# Patient Record
Sex: MALE | Race: WHITE | NOT HISPANIC OR LATINO | Employment: FULL TIME | URBAN - METROPOLITAN AREA
[De-identification: names, ages, dates, MRNs, and addresses within clinical notes are randomized per-mention and may not be internally consistent; named-entity substitution may affect disease eponyms.]

---

## 2017-03-15 RX ORDER — CLOPIDOGREL BISULFATE 75 MG/1
75 TABLET ORAL DAILY
Qty: 90 TABLET | Refills: 1 | Status: SHIPPED | OUTPATIENT
Start: 2017-03-15 | End: 2017-09-18 | Stop reason: SDUPTHER

## 2017-03-15 RX ORDER — METOPROLOL SUCCINATE 100 MG/1
100 TABLET, EXTENDED RELEASE ORAL DAILY
Qty: 90 TABLET | Refills: 3 | Status: SHIPPED | OUTPATIENT
Start: 2017-03-15 | End: 2018-03-21 | Stop reason: SDUPTHER

## 2017-03-15 RX ORDER — LISINOPRIL 20 MG/1
20 TABLET ORAL DAILY
Qty: 90 TABLET | Refills: 1 | Status: SHIPPED | OUTPATIENT
Start: 2017-03-15 | End: 2017-09-18 | Stop reason: SDUPTHER

## 2017-03-15 RX ORDER — ATORVASTATIN CALCIUM 20 MG/1
20 TABLET, FILM COATED ORAL DAILY
Qty: 90 TABLET | Refills: 1 | Status: SHIPPED | OUTPATIENT
Start: 2017-03-15 | End: 2017-09-18 | Stop reason: SDUPTHER

## 2017-03-15 NOTE — TELEPHONE ENCOUNTER
----- Message from Hafsa Venegas sent at 3/15/2017  8:11 AM CDT -----  Contact: Patient  Patient needs refills on Metoprpol SUCC, 100 mg; Atorvastatin, 20 mg; Lisinopril, 20 mg; and   Clopidogrel, 75 mg sent [facility]. Any questions call patient [phone].

## 2017-09-18 DIAGNOSIS — M10.9 GOUT, UNSPECIFIED: ICD-10-CM

## 2017-09-18 RX ORDER — ALLOPURINOL 300 MG/1
300 TABLET ORAL DAILY
Qty: 90 TABLET | Refills: 0 | Status: SHIPPED | OUTPATIENT
Start: 2017-09-18 | End: 2017-12-18 | Stop reason: SDUPTHER

## 2017-09-18 NOTE — TELEPHONE ENCOUNTER
----- Message from Keira Lincoln sent at 9/18/2017  8:21 AM CDT -----  Anthony, patient , Calling to get refill on medications. Rx lisinopril  20 MG tablet; atorvastatin   20 MG tablet; clopidogrel   75 mg tablet.  Please advise. Thanks.    [facility] - [place], [place] - [address] [place], [place] [address] [facility] [address]  Phone: [phone] Fax: [phone]

## 2017-09-18 NOTE — TELEPHONE ENCOUNTER
----- Message from Keira Lincoln sent at 9/18/2017  8:23 AM CDT -----  Contact: Robin Cruz, patient [phone], calling to get a refill on Rx allopurinol  300 MG tablet.  Please advise. Thanks.  [facility] - [place], [place] - [address] [place] [address]  Phone: [phone] Fax: [phone]

## 2017-09-19 RX ORDER — ATORVASTATIN CALCIUM 20 MG/1
20 TABLET, FILM COATED ORAL DAILY
Qty: 90 TABLET | Refills: 1 | Status: SHIPPED | OUTPATIENT
Start: 2017-09-19 | End: 2018-03-21 | Stop reason: SDUPTHER

## 2017-09-19 RX ORDER — CLOPIDOGREL BISULFATE 75 MG/1
75 TABLET ORAL DAILY
Qty: 90 TABLET | Refills: 1 | Status: SHIPPED | OUTPATIENT
Start: 2017-09-19 | End: 2018-03-21 | Stop reason: SDUPTHER

## 2017-09-19 RX ORDER — LISINOPRIL 20 MG/1
20 TABLET ORAL DAILY
Qty: 90 TABLET | Refills: 1 | Status: SHIPPED | OUTPATIENT
Start: 2017-09-19 | End: 2018-03-21 | Stop reason: SDUPTHER

## 2017-09-27 ENCOUNTER — TELEPHONE (OUTPATIENT)
Dept: FAMILY MEDICINE | Facility: CLINIC | Age: 62
End: 2017-09-27

## 2017-09-27 NOTE — TELEPHONE ENCOUNTER
----- Message from Sai Carreno sent at 9/27/2017  2:03 PM CDT -----  Contact: Patient  Patient states that he would like to know if he had a Tetinus Shot in the past 10 years and if so, when.  Please call him back at [phone].  Thank you

## 2017-11-10 ENCOUNTER — TELEPHONE (OUTPATIENT)
Dept: FAMILY MEDICINE | Facility: CLINIC | Age: 62
End: 2017-11-10

## 2017-11-10 NOTE — TELEPHONE ENCOUNTER
----- Message from Hannah Escalante sent at 11/10/2017 12:10 PM CST -----  Contact: self  Patient is needing a call back to discuss flu shot shot/   Please call back [phone]

## 2017-12-18 ENCOUNTER — PATIENT OUTREACH (OUTPATIENT)
Dept: ADMINISTRATIVE | Facility: HOSPITAL | Age: 62
End: 2017-12-18

## 2017-12-18 DIAGNOSIS — I10 ESSENTIAL HYPERTENSION: Primary | ICD-10-CM

## 2017-12-18 DIAGNOSIS — M10.9 GOUT, UNSPECIFIED CAUSE, UNSPECIFIED CHRONICITY, UNSPECIFIED SITE: ICD-10-CM

## 2017-12-18 DIAGNOSIS — M1A.9XX1 CHRONIC TOPHACEOUS GOUT: ICD-10-CM

## 2017-12-18 RX ORDER — ALLOPURINOL 300 MG/1
300 TABLET ORAL DAILY
Qty: 90 TABLET | Refills: 0 | Status: SHIPPED | OUTPATIENT
Start: 2017-12-18 | End: 2018-03-21 | Stop reason: SDUPTHER

## 2017-12-18 NOTE — PROGRESS NOTES
HTN registry, over 1 year.  Called pt to inform:    Due for an office visit with him, your fasting cholesterol labs, colon cancer screening, and possibly some immunizations (tetanus, shingles, and flu)    Last ov Neil 11/2016    Pt scheduled labs and ov - anything you want to add?

## 2017-12-18 NOTE — TELEPHONE ENCOUNTER
----- Message from Keira Lnicoln sent at 12/18/2017  8:30 AM CST -----  Contact: Patient  Anthony, patient [phone], Patient is calling for a new Rx for allopurinol (ZYLOPRIM) 300 MG tablet. He is out of refills. 90 days with refills. Please advise. Thanks.  [facility] - [address] [address]  Phone: [phone] Fax: [phone]

## 2018-02-15 ENCOUNTER — LAB VISIT (OUTPATIENT)
Dept: LAB | Facility: HOSPITAL | Age: 63
End: 2018-02-15
Attending: FAMILY MEDICINE
Payer: COMMERCIAL

## 2018-02-15 ENCOUNTER — OFFICE VISIT (OUTPATIENT)
Dept: FAMILY MEDICINE | Facility: CLINIC | Age: 63
End: 2018-02-15
Payer: COMMERCIAL

## 2018-02-15 ENCOUNTER — OFFICE VISIT (OUTPATIENT)
Dept: CARDIOLOGY | Facility: CLINIC | Age: 63
End: 2018-02-15
Payer: COMMERCIAL

## 2018-02-15 VITALS
BODY MASS INDEX: 37.35 KG/M2 | WEIGHT: 291 LBS | TEMPERATURE: 98 F | HEIGHT: 74 IN | HEART RATE: 60 BPM | DIASTOLIC BLOOD PRESSURE: 72 MMHG | SYSTOLIC BLOOD PRESSURE: 134 MMHG

## 2018-02-15 VITALS
WEIGHT: 291 LBS | HEIGHT: 74 IN | HEART RATE: 65 BPM | BODY MASS INDEX: 37.35 KG/M2 | DIASTOLIC BLOOD PRESSURE: 88 MMHG | SYSTOLIC BLOOD PRESSURE: 138 MMHG

## 2018-02-15 DIAGNOSIS — M1A.9XX1 CHRONIC TOPHACEOUS GOUT: Primary | ICD-10-CM

## 2018-02-15 DIAGNOSIS — I10 ESSENTIAL HYPERTENSION: ICD-10-CM

## 2018-02-15 DIAGNOSIS — M1A.9XX1 CHRONIC TOPHACEOUS GOUT: ICD-10-CM

## 2018-02-15 DIAGNOSIS — Z95.1 S/P CABG (CORONARY ARTERY BYPASS GRAFT): ICD-10-CM

## 2018-02-15 DIAGNOSIS — E78.5 DYSLIPIDEMIA: ICD-10-CM

## 2018-02-15 DIAGNOSIS — I25.9 ISCHEMIC HEART DISEASE DUE TO CORONARY ARTERY OBSTRUCTION: Primary | ICD-10-CM

## 2018-02-15 DIAGNOSIS — I24.0 ISCHEMIC HEART DISEASE DUE TO CORONARY ARTERY OBSTRUCTION: Primary | ICD-10-CM

## 2018-02-15 LAB
ALBUMIN SERPL BCP-MCNC: 3.1 G/DL
ALP SERPL-CCNC: 82 U/L
ALT SERPL W/O P-5'-P-CCNC: 15 U/L
ANION GAP SERPL CALC-SCNC: 9 MMOL/L
AST SERPL-CCNC: 12 U/L
BILIRUB SERPL-MCNC: 1 MG/DL
BUN SERPL-MCNC: 21 MG/DL
CALCIUM SERPL-MCNC: 9.2 MG/DL
CHLORIDE SERPL-SCNC: 111 MMOL/L
CHOLEST SERPL-MCNC: 138 MG/DL
CHOLEST/HDLC SERPL: 4.9 {RATIO}
CO2 SERPL-SCNC: 25 MMOL/L
CREAT SERPL-MCNC: 1.8 MG/DL
EST. GFR  (AFRICAN AMERICAN): 45.3 ML/MIN/1.73 M^2
EST. GFR  (NON AFRICAN AMERICAN): 39.2 ML/MIN/1.73 M^2
GLUCOSE SERPL-MCNC: 137 MG/DL
HDLC SERPL-MCNC: 28 MG/DL
HDLC SERPL: 20.3 %
LDLC SERPL CALC-MCNC: 84.6 MG/DL
NONHDLC SERPL-MCNC: 110 MG/DL
POTASSIUM SERPL-SCNC: 5.2 MMOL/L
PROT SERPL-MCNC: 6.6 G/DL
SODIUM SERPL-SCNC: 145 MMOL/L
TRIGL SERPL-MCNC: 127 MG/DL
URATE SERPL-MCNC: 6.4 MG/DL

## 2018-02-15 PROCEDURE — 99999 PR PBB SHADOW E&M-EST. PATIENT-LVL III: CPT | Mod: PBBFAC,,, | Performed by: FAMILY MEDICINE

## 2018-02-15 PROCEDURE — 3008F BODY MASS INDEX DOCD: CPT | Mod: S$GLB,,, | Performed by: INTERNAL MEDICINE

## 2018-02-15 PROCEDURE — 84550 ASSAY OF BLOOD/URIC ACID: CPT

## 2018-02-15 PROCEDURE — 80061 LIPID PANEL: CPT

## 2018-02-15 PROCEDURE — 80053 COMPREHEN METABOLIC PANEL: CPT

## 2018-02-15 PROCEDURE — 36415 COLL VENOUS BLD VENIPUNCTURE: CPT | Mod: PO

## 2018-02-15 PROCEDURE — 3008F BODY MASS INDEX DOCD: CPT | Mod: S$GLB,,, | Performed by: FAMILY MEDICINE

## 2018-02-15 PROCEDURE — 99214 OFFICE O/P EST MOD 30 MIN: CPT | Mod: S$GLB,,, | Performed by: FAMILY MEDICINE

## 2018-02-15 PROCEDURE — 99214 OFFICE O/P EST MOD 30 MIN: CPT | Mod: S$GLB,,, | Performed by: INTERNAL MEDICINE

## 2018-02-15 PROCEDURE — 99999 PR PBB SHADOW E&M-EST. PATIENT-LVL II: CPT | Mod: PBBFAC,,, | Performed by: INTERNAL MEDICINE

## 2018-02-15 NOTE — PROGRESS NOTES
"Subjective:       Patient ID: Anthony Kline is a 62 y.o. male.    Chief Complaint: Follow-up    Health maintenance and follow-up of tophaceous gout.  He has been on allopurinol 300 mg.  His tophi have for the most part decreased in size and he has had no acute gout attacks.  He saw cardiology today.  He has had no chest pain or dyspnea.  He is status post bypass surgery in 2013.  He had a recent episode of low back pain.  It took a week or so to resolve.  He has gained a lot of weight in the past year.  He had lost down to 230 pounds after his heart bypass but has gradually regained it.      Review of Systems   Constitutional: Positive for unexpected weight change. Negative for activity change and fever.   HENT: Negative for hearing loss, rhinorrhea and trouble swallowing.    Eyes: Negative for discharge and visual disturbance.   Respiratory: Negative for chest tightness, shortness of breath and wheezing.    Cardiovascular: Negative for chest pain and palpitations.   Gastrointestinal: Negative for blood in stool, constipation, diarrhea and vomiting.   Endocrine: Negative for polydipsia and polyuria.   Genitourinary: Negative for difficulty urinating, hematuria and urgency.   Musculoskeletal: Positive for back pain. Negative for arthralgias, joint swelling and neck pain.   Neurological: Negative for weakness and headaches.   Psychiatric/Behavioral: Negative for confusion and dysphoric mood.       Objective:     Blood pressure 134/72, pulse 60, temperature 98.4 °F (36.9 °C), height 6' 2" (1.88 m), weight 132 kg (291 lb 0.1 oz).      Physical Exam   Constitutional:   He is overweight and in no distress.   HENT:   Mouth/Throat: Oropharynx is clear and moist. No oropharyngeal exudate.   Neck: No thyromegaly present.   Cardiovascular: Normal rate, regular rhythm, normal heart sounds and intact distal pulses.    No murmur heard.  No carotid bruits   Pulmonary/Chest: Effort normal and breath sounds normal. No " respiratory distress.   Abdominal: Soft. Bowel sounds are normal.   Musculoskeletal: He exhibits no edema.   He has thickened tissue in both olecranon bursae.  Tophi right fourth finger and dorsum of left hand.   Lymphadenopathy:     He has no cervical adenopathy.   Neurological: He is alert.       Assessment:       1. Chronic tophaceous gout    2. Dyslipidemia    3. Essential hypertension    4. S/P CABG (coronary artery bypass graft)        Plan:       Continue allopurinol 300 mg.  I reviewed recent lab work.  We discussed colon cancer screening.  Cologuard.  We talked about an eye exam but he is not interested.

## 2018-02-15 NOTE — PROGRESS NOTES
Subjective:    Patient ID:  Anthony Kline is a 62 y.o. male who presents for follow-up of cad    HPI  He comes for follow up with no major problems, no chest pain, no shortness of breath.  FC II    Review of Systems   Constitution: Negative for decreased appetite, weakness, malaise/fatigue, weight gain and weight loss.   Cardiovascular: Negative for chest pain, dyspnea on exertion, leg swelling, palpitations and syncope.   Respiratory: Negative for cough and shortness of breath.    Gastrointestinal: Negative.    All other systems reviewed and are negative.       Objective:    Physical Exam   Constitutional: He is oriented to person, place, and time. He appears well-developed and well-nourished.   HENT:   Head: Normocephalic.   Eyes: Pupils are equal, round, and reactive to light.   Neck: Normal range of motion. Neck supple. No JVD present. Carotid bruit is not present. No thyromegaly present.   Cardiovascular: Normal rate, regular rhythm, normal heart sounds, intact distal pulses and normal pulses.  PMI is not displaced.  Exam reveals no gallop.    No murmur heard.  Pulmonary/Chest: Effort normal and breath sounds normal.   Abdominal: Soft. Normal appearance. He exhibits no mass. There is no hepatosplenomegaly. There is no tenderness.   Musculoskeletal: Normal range of motion. He exhibits no edema.   Neurological: He is alert and oriented to person, place, and time. He has normal strength and normal reflexes. No sensory deficit.   Skin: Skin is warm and intact.   Psychiatric: He has a normal mood and affect.   Nursing note and vitals reviewed.        Assessment:       1. Ischemic heart disease due to coronary artery obstruction    2. Essential hypertension    3. Dyslipidemia    4. S/P CABG (coronary artery bypass graft)         Plan:     Continue all cardiac medications  Regular exercise program  Weight loss  1 yr f/u with stress echo

## 2018-03-21 DIAGNOSIS — M10.9 GOUT, UNSPECIFIED CAUSE, UNSPECIFIED CHRONICITY, UNSPECIFIED SITE: ICD-10-CM

## 2018-03-21 RX ORDER — LISINOPRIL 20 MG/1
20 TABLET ORAL DAILY
Qty: 90 TABLET | Refills: 3 | Status: SHIPPED | OUTPATIENT
Start: 2018-03-21 | End: 2021-10-05 | Stop reason: ALTCHOICE

## 2018-03-21 RX ORDER — CLOPIDOGREL BISULFATE 75 MG/1
75 TABLET ORAL DAILY
Qty: 90 TABLET | Refills: 3 | Status: SHIPPED | OUTPATIENT
Start: 2018-03-21

## 2018-03-21 RX ORDER — ATORVASTATIN CALCIUM 20 MG/1
20 TABLET, FILM COATED ORAL DAILY
Qty: 90 TABLET | Refills: 3 | Status: SHIPPED | OUTPATIENT
Start: 2018-03-21

## 2018-03-21 RX ORDER — ALLOPURINOL 300 MG/1
300 TABLET ORAL DAILY
Qty: 90 TABLET | Refills: 0 | Status: SHIPPED | OUTPATIENT
Start: 2018-03-21 | End: 2018-06-18 | Stop reason: SDUPTHER

## 2018-03-21 RX ORDER — METOPROLOL SUCCINATE 100 MG/1
100 TABLET, EXTENDED RELEASE ORAL DAILY
Qty: 90 TABLET | Refills: 3 | Status: SHIPPED | OUTPATIENT
Start: 2018-03-21

## 2018-03-21 NOTE — TELEPHONE ENCOUNTER
----- Message from Aminata Hussein sent at 3/21/2018  8:12 AM CDT -----  Contact: self  Patient called regarding refill of medications. Please contact [phone] (home)       clopidogrel (PLAVIX) 75 mg tablet  atorvastatin (LIPITOR) 20 MG tablet  metoprolol succinate (TOPROL-XL) 100 MG 24 hr tablet  lisinopril (PRINIVIL,ZESTRIL) 20 MG tablet      [facility] - [address] [address]  Phone: [phone] Fax: [phone]

## 2018-03-21 NOTE — TELEPHONE ENCOUNTER
----- Message from Aminata Hussein sent at 3/21/2018  8:11 AM CDT -----  Contact: self  Patient called regarding refill of medication. Please contact [phone] (home)     allopurinol (ZYLOPRIM) 300 MG tablet    [facility] - [facility], [place] - [address] [place] [address]  Phone: [phone] Fax: [phone]

## 2018-03-22 ENCOUNTER — TELEPHONE (OUTPATIENT)
Dept: FAMILY MEDICINE | Facility: CLINIC | Age: 63
End: 2018-03-22

## 2018-03-22 NOTE — TELEPHONE ENCOUNTER
----- Message from Carolyn Jackson sent at 3/22/2018  8:17 AM CDT -----  Contact: self  Patient [phone] called and left a message yesterday about having some prescriptions refilled but did not receive any response back/he is calling to leave a second message today/please advise

## 2018-03-22 NOTE — TELEPHONE ENCOUNTER
----- Message from Carolyn Jackson sent at 3/22/2018  8:17 AM CDT -----  Contact: self  Patient [phone] called and left a message yesterday about having some prescriptions refilled but did not receive any response back/he is calling to leave a second message today/please advise

## 2018-06-18 DIAGNOSIS — M10.9 GOUT, UNSPECIFIED CAUSE, UNSPECIFIED CHRONICITY, UNSPECIFIED SITE: ICD-10-CM

## 2018-06-18 RX ORDER — ALLOPURINOL 300 MG/1
300 TABLET ORAL DAILY
Qty: 90 TABLET | Refills: 1 | Status: SHIPPED | OUTPATIENT
Start: 2018-06-18 | End: 2018-12-14 | Stop reason: SDUPTHER

## 2018-06-18 NOTE — TELEPHONE ENCOUNTER
----- Message from Mana Bryanza sent at 6/16/2018  8:38 AM CDT -----  Contact: self [phone]  Type:  RX Refill Request    Who Called:  Anthony  Refill or New Rx:  refill  RX Name and Strength:  allopurinol 300 mg  How is the patient currently taking it? (ex. 1XDay):  1XDay  Is this a 30 day or 90 day RX:  90  Preferred Pharmacy with phone number:    [facility] - [address] [address]  Phone: [phone] Fax: [phone]  Local or Mail Order:  mail  Ordering Provider:  Dr Neil Bautista Call Back Number:  [phone]  Additional Information:  Thank you!

## 2018-09-18 ENCOUNTER — TELEPHONE (OUTPATIENT)
Dept: CARDIOLOGY | Facility: CLINIC | Age: 63
End: 2018-09-18

## 2018-09-18 NOTE — TELEPHONE ENCOUNTER
Patient has been notified of his last shot and verbalized understanding. He states he will call back if needed.

## 2018-09-18 NOTE — TELEPHONE ENCOUNTER
----- Message from Bello Harris sent at 9/18/2018 10:14 AM CDT -----  Contact: patient  Anthony, [phone]. Calling to find out the last time he got his pneumonia shot. Says he was in the hospital and it looked as if he had pneumonia, so he just wanted to check when he last had it to know if he needs to get it again. Please advise. Thanks.

## 2018-10-10 ENCOUNTER — TELEPHONE (OUTPATIENT)
Dept: FAMILY MEDICINE | Facility: CLINIC | Age: 63
End: 2018-10-10

## 2018-10-10 NOTE — TELEPHONE ENCOUNTER
Patient calling just to make sure ok to come for his Flu shot on 10/16 since he was in the hospital confirm patient will be here

## 2018-10-10 NOTE — TELEPHONE ENCOUNTER
----- Message from Carolyn Staley sent at 10/10/2018  9:46 AM CDT -----  Contact: Anthony  Type: Needs Medical Advice    Who Called:  patient  Best Call Back Number: [phone]  Additional Information:  Patient was hospitalized at [facility]; had surgery to remove fluid; Dx pneumonia; discharged 9/28/18; scheduled for Flu vaccination 10/16/18. Thanks!

## 2018-10-16 ENCOUNTER — IMMUNIZATION (OUTPATIENT)
Dept: FAMILY MEDICINE | Facility: CLINIC | Age: 63
End: 2018-10-16
Payer: COMMERCIAL

## 2018-10-16 PROCEDURE — 90686 IIV4 VACC NO PRSV 0.5 ML IM: CPT | Mod: S$GLB,,, | Performed by: INTERNAL MEDICINE

## 2018-10-16 PROCEDURE — 90471 IMMUNIZATION ADMIN: CPT | Mod: S$GLB,,, | Performed by: INTERNAL MEDICINE

## 2018-11-20 ENCOUNTER — HISTORICAL (OUTPATIENT)
Dept: LAB | Facility: HOSPITAL | Age: 63
End: 2018-11-20

## 2018-11-20 LAB
CREAT UR-MCNC: 169.9 MG/DL (ref 30–125)
EST. AVERAGE GLUCOSE BLD GHB EST-MCNC: 128 MG/DL
HBA1C MFR BLD: 6.1 % (ref 4.5–6.2)
MICROALBUMIN UR-MCNC: 2 MG/DL (ref 0–3)
MICROALBUMIN/CREAT RATIO PNL UR: 11.8 MG/GM CR (ref 0–30)

## 2018-12-14 DIAGNOSIS — M10.9 GOUT, UNSPECIFIED CAUSE, UNSPECIFIED CHRONICITY, UNSPECIFIED SITE: ICD-10-CM

## 2018-12-14 NOTE — TELEPHONE ENCOUNTER
----- Message from Caro Arita sent at 12/14/2018  2:58 PM CST -----  Contact: Patient  Type:  RX Refill Request     Who Called:  Patient  Refill or New Rx: Refill  RX Name and Strength:  allopurinol (ZYLOPRIM) 300 MG tablet  How is the patient currently taking it? (ex. 1XDay):  1xday  Is this a 30 day or 90 day RX:  90  Preferred Pharmacy with phone number:    [facility]  [address] [address]  Phone: [phone] Fax: [phone]  Local or Mail Order:  Mail Order  Ordering Provider:  Dr. Darien Bautista Call Back Number:  [phone]  Please call to advise

## 2018-12-15 RX ORDER — ALLOPURINOL 300 MG/1
300 TABLET ORAL DAILY
Qty: 90 TABLET | Refills: 0 | Status: SHIPPED | OUTPATIENT
Start: 2018-12-15 | End: 2019-03-21 | Stop reason: SDUPTHER

## 2019-03-21 ENCOUNTER — TELEPHONE (OUTPATIENT)
Dept: CARDIOLOGY | Facility: CLINIC | Age: 64
End: 2019-03-21

## 2019-03-21 ENCOUNTER — PATIENT MESSAGE (OUTPATIENT)
Dept: FAMILY MEDICINE | Facility: CLINIC | Age: 64
End: 2019-03-21

## 2019-03-21 DIAGNOSIS — I10 ESSENTIAL HYPERTENSION: Primary | ICD-10-CM

## 2019-03-21 DIAGNOSIS — M10.9 GOUT, UNSPECIFIED CAUSE, UNSPECIFIED CHRONICITY, UNSPECIFIED SITE: ICD-10-CM

## 2019-03-21 RX ORDER — ALLOPURINOL 300 MG/1
300 TABLET ORAL DAILY
Qty: 90 TABLET | Refills: 0 | Status: SHIPPED | OUTPATIENT
Start: 2019-03-21 | End: 2019-06-24 | Stop reason: SDUPTHER

## 2019-03-21 NOTE — TELEPHONE ENCOUNTER
----- Message from Ariel Franklin sent at 3/21/2019 10:07 AM CDT -----  Contact: patient  Type: Needs Medical Advice    Who Called:  Patient  Symptoms (please be specific):    How long has patient had these symptoms:    Pharmacy name and phone #:    Best Call Back Number: [phone]  Additional Information: requesting a call back have questions regarding pneumonia shots?

## 2019-03-21 NOTE — TELEPHONE ENCOUNTER
----- Message from Carolyn Jackson sent at 3/21/2019  8:36 AM CDT -----  Contact: self  Patient [phone] is asking to speak with the Nurse about having his Allopurinol refilled/please call

## 2019-03-21 NOTE — TELEPHONE ENCOUNTER
----- Message from Carolyn Jackson sent at 3/21/2019  8:38 AM CDT -----  Contact: self  Patient [phone] is asking when he had his last pneumonia vaccine and what types of pneumonia vaccines has he had?/please advise

## 2019-03-25 ENCOUNTER — LAB VISIT (OUTPATIENT)
Dept: LAB | Facility: HOSPITAL | Age: 64
End: 2019-03-25
Attending: FAMILY MEDICINE
Payer: COMMERCIAL

## 2019-03-25 DIAGNOSIS — I10 ESSENTIAL HYPERTENSION: ICD-10-CM

## 2019-03-25 DIAGNOSIS — M10.9 GOUT, UNSPECIFIED CAUSE, UNSPECIFIED CHRONICITY, UNSPECIFIED SITE: ICD-10-CM

## 2019-03-25 LAB
ALBUMIN SERPL BCP-MCNC: 3.5 G/DL (ref 3.5–5.2)
ALP SERPL-CCNC: 93 U/L (ref 55–135)
ALT SERPL W/O P-5'-P-CCNC: 15 U/L (ref 10–44)
ANION GAP SERPL CALC-SCNC: 7 MMOL/L (ref 8–16)
AST SERPL-CCNC: 9 U/L (ref 10–40)
BILIRUB SERPL-MCNC: 0.6 MG/DL (ref 0.1–1)
BUN SERPL-MCNC: 34 MG/DL (ref 8–23)
CALCIUM SERPL-MCNC: 9.3 MG/DL (ref 8.7–10.5)
CHLORIDE SERPL-SCNC: 104 MMOL/L (ref 95–110)
CHOLEST SERPL-MCNC: 158 MG/DL (ref 120–199)
CHOLEST/HDLC SERPL: 4.3 {RATIO} (ref 2–5)
CO2 SERPL-SCNC: 24 MMOL/L (ref 23–29)
CREAT SERPL-MCNC: 1.7 MG/DL (ref 0.5–1.4)
EST. GFR  (AFRICAN AMERICAN): 48.2 ML/MIN/1.73 M^2
EST. GFR  (NON AFRICAN AMERICAN): 41.7 ML/MIN/1.73 M^2
GLUCOSE SERPL-MCNC: 171 MG/DL (ref 70–110)
HDLC SERPL-MCNC: 37 MG/DL (ref 40–75)
HDLC SERPL: 23.4 % (ref 20–50)
LDLC SERPL CALC-MCNC: 91.4 MG/DL (ref 63–159)
NONHDLC SERPL-MCNC: 121 MG/DL
POTASSIUM SERPL-SCNC: 5.1 MMOL/L (ref 3.5–5.1)
PROT SERPL-MCNC: 6.9 G/DL (ref 6–8.4)
SODIUM SERPL-SCNC: 135 MMOL/L (ref 136–145)
TRIGL SERPL-MCNC: 148 MG/DL (ref 30–150)
URATE SERPL-MCNC: 4.9 MG/DL (ref 3.4–7)

## 2019-03-25 PROCEDURE — 84550 ASSAY OF BLOOD/URIC ACID: CPT

## 2019-03-25 PROCEDURE — 36415 COLL VENOUS BLD VENIPUNCTURE: CPT | Mod: PO

## 2019-03-25 PROCEDURE — 80061 LIPID PANEL: CPT

## 2019-03-25 PROCEDURE — 80053 COMPREHEN METABOLIC PANEL: CPT

## 2019-04-08 ENCOUNTER — OFFICE VISIT (OUTPATIENT)
Dept: FAMILY MEDICINE | Facility: CLINIC | Age: 64
End: 2019-04-08
Payer: COMMERCIAL

## 2019-04-08 ENCOUNTER — LAB VISIT (OUTPATIENT)
Dept: LAB | Facility: HOSPITAL | Age: 64
End: 2019-04-08
Attending: FAMILY MEDICINE
Payer: COMMERCIAL

## 2019-04-08 VITALS
WEIGHT: 282.75 LBS | DIASTOLIC BLOOD PRESSURE: 90 MMHG | HEIGHT: 74 IN | HEART RATE: 68 BPM | SYSTOLIC BLOOD PRESSURE: 138 MMHG | TEMPERATURE: 98 F | BODY MASS INDEX: 36.29 KG/M2

## 2019-04-08 DIAGNOSIS — Z95.1 S/P CABG (CORONARY ARTERY BYPASS GRAFT): ICD-10-CM

## 2019-04-08 DIAGNOSIS — I24.0 ISCHEMIC HEART DISEASE DUE TO CORONARY ARTERY OBSTRUCTION: ICD-10-CM

## 2019-04-08 DIAGNOSIS — E11.42 TYPE 2 DIABETES MELLITUS WITH DIABETIC POLYNEUROPATHY, WITHOUT LONG-TERM CURRENT USE OF INSULIN: ICD-10-CM

## 2019-04-08 DIAGNOSIS — E11.9 TYPE 2 DIABETES MELLITUS WITHOUT COMPLICATION, WITHOUT LONG-TERM CURRENT USE OF INSULIN: ICD-10-CM

## 2019-04-08 DIAGNOSIS — M1A.9XX1 CHRONIC TOPHACEOUS GOUT: ICD-10-CM

## 2019-04-08 DIAGNOSIS — Z12.5 SCREENING FOR PROSTATE CANCER: ICD-10-CM

## 2019-04-08 DIAGNOSIS — I25.9 ISCHEMIC HEART DISEASE DUE TO CORONARY ARTERY OBSTRUCTION: ICD-10-CM

## 2019-04-08 DIAGNOSIS — I10 ESSENTIAL HYPERTENSION: Primary | ICD-10-CM

## 2019-04-08 PROBLEM — Z09 S/P LUNG SURGERY, FOLLOW-UP EXAM: Status: ACTIVE | Noted: 2018-10-22

## 2019-04-08 LAB
COMPLEXED PSA SERPL-MCNC: 0.31 NG/ML (ref 0–4)
ESTIMATED AVG GLUCOSE: 140 MG/DL (ref 68–131)
HBA1C MFR BLD HPLC: 6.5 % (ref 4–5.6)

## 2019-04-08 PROCEDURE — 84153 ASSAY OF PSA TOTAL: CPT

## 2019-04-08 PROCEDURE — 36415 COLL VENOUS BLD VENIPUNCTURE: CPT | Mod: PN

## 2019-04-08 PROCEDURE — 99999 PR PBB SHADOW E&M-EST. PATIENT-LVL III: CPT | Mod: PBBFAC,,, | Performed by: FAMILY MEDICINE

## 2019-04-08 PROCEDURE — 99213 PR OFFICE/OUTPT VISIT, EST, LEVL III, 20-29 MIN: ICD-10-PCS | Mod: S$GLB,,, | Performed by: FAMILY MEDICINE

## 2019-04-08 PROCEDURE — 99999 PR PBB SHADOW E&M-EST. PATIENT-LVL III: ICD-10-PCS | Mod: PBBFAC,,, | Performed by: FAMILY MEDICINE

## 2019-04-08 PROCEDURE — 3080F DIAST BP >= 90 MM HG: CPT | Mod: CPTII,S$GLB,, | Performed by: FAMILY MEDICINE

## 2019-04-08 PROCEDURE — 99213 OFFICE O/P EST LOW 20 MIN: CPT | Mod: S$GLB,,, | Performed by: FAMILY MEDICINE

## 2019-04-08 PROCEDURE — 83036 HEMOGLOBIN GLYCOSYLATED A1C: CPT

## 2019-04-08 PROCEDURE — 3080F PR MOST RECENT DIASTOLIC BLOOD PRESSURE >= 90 MM HG: ICD-10-PCS | Mod: CPTII,S$GLB,, | Performed by: FAMILY MEDICINE

## 2019-04-08 PROCEDURE — 3075F PR MOST RECENT SYSTOLIC BLOOD PRESS GE 130-139MM HG: ICD-10-PCS | Mod: CPTII,S$GLB,, | Performed by: FAMILY MEDICINE

## 2019-04-08 PROCEDURE — 3075F SYST BP GE 130 - 139MM HG: CPT | Mod: CPTII,S$GLB,, | Performed by: FAMILY MEDICINE

## 2019-04-08 RX ORDER — ACARBOSE 50 MG/1
25 TABLET ORAL
COMMUNITY
End: 2019-04-23 | Stop reason: SDUPTHER

## 2019-04-08 NOTE — PROGRESS NOTES
"Subjective:       Patient ID: Anthony Kline is a 63 y.o. male.    Chief Complaint: Follow-up (Pt had wellness w/ employer. HgbA1c was 6.1. Pt has been taking Acarbose TID)    He is here for an annual exam.  He lives in [place] and was hospitalized in September for a left pleural effusion.  He underwent VATS and decortication.  He had presented with coughing and chest pain and dyspnea.  His creatinine had gone up to 3.0.  It has since stabilized at 1.7.  His last chest x-ray in October still showed some opacity on the left lung.  Perhaps that was scar tissue.  He takes Precose 50 mg t.i.d. for diabetes.  His hemoglobin A1c was 6.1%.  He does like jelly beans and potatoes.  He gets some exercise with walking.  He has a history of coronary bypass surgery and has had no chest pain or palpitations.  He continues to take atorvastatin.  He has tophaceous gout but the tophi have shrunken on his allopurinol 300 mg.  His most recent uric acid level was 4.9.  He is diabetic and has not had a routine eye exam.    Review of Systems   Constitutional: Negative for fatigue, fever and unexpected weight change.   HENT: Negative.    Eyes: Negative for visual disturbance.   Respiratory: Negative for cough, shortness of breath and wheezing.    Cardiovascular: Negative for chest pain, palpitations and leg swelling.   Gastrointestinal: Negative for abdominal pain, blood in stool and diarrhea.   Genitourinary: Negative for difficulty urinating and hematuria.   Skin:        No neoplasms    Neurological: Negative for weakness and numbness.       Objective:     Blood pressure (!) 138/90, pulse 68, temperature 97.9 °F (36.6 °C), temperature source Oral, height 6' 2" (1.88 m), weight 128.3 kg (282 lb 11.8 oz).      Physical Exam   Constitutional: He appears well-developed and well-nourished.   No distress   HENT:   Nose clear. TM's wnl. No oral lesions.    Eyes: Pupils are equal, round, and reactive to light. Conjunctivae and EOM are " normal.   Neck: Normal range of motion. No thyromegaly present.   Cardiovascular: Regular rhythm and normal heart sounds.   Pulses:       Dorsalis pedis pulses are 2+ on the right side, and 2+ on the left side.        Posterior tibial pulses are 2+ on the right side, and 2+ on the left side.   Pulmonary/Chest: Effort normal and breath sounds normal. He has no wheezes. He has no rales.   Abdominal: Soft. Bowel sounds are normal. He exhibits no mass. There is no tenderness.   Musculoskeletal:        Right foot: There is deformity.        Left foot: There is deformity.   Feet:   Right Foot:   Protective Sensation: 4 sites tested. 2 sites sensed.   Left Foot:   Protective Sensation: 4 sites tested. 2 sites sensed.   Lymphadenopathy:     He has no cervical adenopathy.   Neurological: He is alert.   He has decreased sensation in both feet to vibration and I think slightly decreased to monofilament.   Skin:   No rash or lesions       Assessment:       1. Essential hypertension    2. Chronic tophaceous gout    3. Ischemic heart disease due to coronary artery obstruction    4. S/P CABG (coronary artery bypass graft)    5. Screening for prostate cancer    6. Type 2 diabetes mellitus with diabetic polyneuropathy, without long-term current use of insulin    7. Type 2 diabetes mellitus without complication, without long-term current use of insulin        Plan:       I encouraged him to get an eye exam.  We discussed colon cancer screening.  He says that [employer] will not cover Cologuard.  He may want to get colon cancer screening in [place] or [place] because of transportation issues.    PSA and hemoglobin A1c today.  I encouraged him to have follow-up with his lung doctor for the pleural effusion.  He does have some mild dullness to percussion on the left lower posterior lung field.

## 2019-04-08 NOTE — PATIENT INSTRUCTIONS
Colon cancer screen. Stool for hidden blood or colonoscopy  Get a dilated eye exam and have them send us a report.   See the lung doctor for followup of the pleural effusion.

## 2019-04-23 DIAGNOSIS — E11.42 TYPE 2 DIABETES MELLITUS WITH DIABETIC POLYNEUROPATHY, WITHOUT LONG-TERM CURRENT USE OF INSULIN: Primary | ICD-10-CM

## 2019-04-23 RX ORDER — ACARBOSE 50 MG/1
50 TABLET ORAL
Qty: 270 TABLET | Refills: 3 | Status: SHIPPED | OUTPATIENT
Start: 2019-04-23 | End: 2020-04-07 | Stop reason: SDUPTHER

## 2019-04-23 NOTE — TELEPHONE ENCOUNTER
----- Message from Sandhya Flores sent at 4/23/2019  8:48 AM CDT -----  Type:  RX Refill Request    Who Called:  Patient  Refill or New Rx:  New  RX Name and Strength:  acarbose (PRECOSE) 50 MG Tab   How is the patient currently taking it? (ex. 1XDay):  3xday  Is this a 30 day or 90 day RX:  180 pills  Preferred Pharmacy with phone number:    [facility]  [address] [address]  Phone: [phone] Fax: [phone]    Local or Mail Order:  Mail Order  Ordering Provider:  A previous doctor  Best Call Back Number:  [phone] (home)     Additional Information:  Asking Dr. Trejo to fill this or an alternative to this. It was filled by a doctor when he was in the hospital and is asking office to fill this. Please call to advise. He only has a few pills left.

## 2019-04-23 NOTE — TELEPHONE ENCOUNTER
Spoke to patient and clarified the sig as 50 mg tablet, 1 tablet by mouth, 3 times daily. Patient states if Dr. Trejo would send either this medication or an alternative of his choosing, to [facility], it would be appreciated.     LOV: 4/8/19  A1C: 6.5

## 2019-06-24 DIAGNOSIS — M10.9 GOUT, UNSPECIFIED CAUSE, UNSPECIFIED CHRONICITY, UNSPECIFIED SITE: ICD-10-CM

## 2019-06-24 RX ORDER — ALLOPURINOL 300 MG/1
300 TABLET ORAL DAILY
Qty: 90 TABLET | Refills: 3 | Status: SHIPPED | OUTPATIENT
Start: 2019-06-24 | End: 2020-06-11 | Stop reason: SDUPTHER

## 2019-06-24 NOTE — TELEPHONE ENCOUNTER
----- Message from Ariel Franklin sent at 6/24/2019  8:16 AM CDT -----  Contact: patient  Type:  RX Refill Request    Who Called:  patient  Refill or New Rx:  refill  RX Name and Strength:  allopurinol (ZYLOPRIM) 300 MG tablet  How is the patient currently taking it? (ex. 1XDay):    Is this a 30 day or 90 day RX:  90 day supply  Preferred Pharmacy with phone number:  [facility]  Local or Mail Order:  Mail order  Ordering Provider:  Dr.Jens Bautista Call Back Number:  [phone]  Additional Information:  Requesting a call back when script has been sent

## 2019-07-08 PROBLEM — Z09 S/P LUNG SURGERY, FOLLOW-UP EXAM: Status: RESOLVED | Noted: 2018-10-22 | Resolved: 2019-07-08

## 2019-09-05 ENCOUNTER — TELEPHONE (OUTPATIENT)
Dept: FAMILY MEDICINE | Facility: CLINIC | Age: 64
End: 2019-09-05

## 2019-09-05 DIAGNOSIS — E11.9 TYPE 2 DIABETES MELLITUS WITHOUT COMPLICATION, WITHOUT LONG-TERM CURRENT USE OF INSULIN: Primary | ICD-10-CM

## 2019-10-15 ENCOUNTER — PATIENT OUTREACH (OUTPATIENT)
Dept: ADMINISTRATIVE | Facility: HOSPITAL | Age: 64
End: 2019-10-15

## 2019-10-17 DIAGNOSIS — E11.9 TYPE 2 DIABETES MELLITUS WITHOUT COMPLICATION: ICD-10-CM

## 2019-10-21 ENCOUNTER — CLINICAL SUPPORT (OUTPATIENT)
Dept: FAMILY MEDICINE | Facility: CLINIC | Age: 64
End: 2019-10-21
Payer: MEDICARE

## 2019-10-21 ENCOUNTER — IMMUNIZATION (OUTPATIENT)
Dept: FAMILY MEDICINE | Facility: CLINIC | Age: 64
End: 2019-10-21
Payer: MEDICARE

## 2019-10-21 DIAGNOSIS — E11.9 TYPE 2 DIABETES MELLITUS WITHOUT COMPLICATION, WITHOUT LONG-TERM CURRENT USE OF INSULIN: ICD-10-CM

## 2019-10-21 PROCEDURE — 92250 DIABETIC EYE SCREENING PHOTO: ICD-10-PCS | Mod: TC,S$GLB,, | Performed by: FAMILY MEDICINE

## 2019-10-21 PROCEDURE — G0008 FLU VACCINE - HIGH DOSE (65+) PRESERVATIVE FREE IM: ICD-10-PCS | Mod: S$GLB,,, | Performed by: INTERNAL MEDICINE

## 2019-10-21 PROCEDURE — 90662 FLU VACCINE - HIGH DOSE (65+) PRESERVATIVE FREE IM: ICD-10-PCS | Mod: S$GLB,,, | Performed by: INTERNAL MEDICINE

## 2019-10-21 PROCEDURE — 92250 DIABETIC EYE SCREENING PHOTO: ICD-10-PCS | Mod: 26,S$GLB,, | Performed by: OPHTHALMOLOGY

## 2019-10-21 PROCEDURE — 92250 FUNDUS PHOTOGRAPHY W/I&R: CPT | Mod: TC,S$GLB,, | Performed by: FAMILY MEDICINE

## 2019-10-21 PROCEDURE — 92250 FUNDUS PHOTOGRAPHY W/I&R: CPT | Mod: 26,S$GLB,, | Performed by: OPHTHALMOLOGY

## 2019-10-21 PROCEDURE — 90662 IIV NO PRSV INCREASED AG IM: CPT | Mod: S$GLB,,, | Performed by: INTERNAL MEDICINE

## 2019-10-21 PROCEDURE — G0008 ADMIN INFLUENZA VIRUS VAC: HCPCS | Mod: S$GLB,,, | Performed by: INTERNAL MEDICINE

## 2019-10-21 NOTE — PROGRESS NOTES
Anthony Kline is a 64 y.o. male here for a diabetic eye screening with non-dilated fundus photos per Dr. Trejo.    For exam results, see Encounter Report.

## 2019-10-22 ENCOUNTER — TELEPHONE (OUTPATIENT)
Dept: FAMILY MEDICINE | Facility: CLINIC | Age: 64
End: 2019-10-22

## 2019-10-22 ENCOUNTER — TELEPHONE (OUTPATIENT)
Dept: OPHTHALMOLOGY | Facility: CLINIC | Age: 64
End: 2019-10-22

## 2019-10-22 NOTE — TELEPHONE ENCOUNTER
Spoke w/ pt about scheduling DM eye exam. Pt lives in [facility]. He is going to contact his insurance and find an Optometrist closer to his residence. He is going to try and find one within [facility] in [place]. He will call us back and let us know who he will see so we can place referral.

## 2019-10-22 NOTE — TELEPHONE ENCOUNTER
Called patient regarding diabetic eye screening results lvm; Requires eye exam due to inadequate image quality for one or both eyes. Follow up in 1 month.    ----- Message from Verna Momin sent at 10/21/2019  3:06 PM CDT -----      ----- Message -----  From: Sergio Payne MD  Sent: 10/21/2019   2:44 PM CDT  To: Anthony Loza Staff

## 2019-10-25 ENCOUNTER — TELEPHONE (OUTPATIENT)
Dept: FAMILY MEDICINE | Facility: CLINIC | Age: 64
End: 2019-10-25

## 2019-10-25 NOTE — TELEPHONE ENCOUNTER
----- Message from Royal Singh sent at 10/25/2019  9:57 AM CDT -----  Contact: Dr. Hardik Gandhi/Cardiologist--Yumiko Calderon called in and would like to get a copy of patient labs that were done on 4/8/19 faxed to her at [phone] & call back number is [phone]

## 2020-04-07 DIAGNOSIS — E11.42 TYPE 2 DIABETES MELLITUS WITH DIABETIC POLYNEUROPATHY, WITHOUT LONG-TERM CURRENT USE OF INSULIN: ICD-10-CM

## 2020-04-07 RX ORDER — ACARBOSE 50 MG/1
50 TABLET ORAL
Qty: 270 TABLET | Refills: 0 | Status: SHIPPED | OUTPATIENT
Start: 2020-04-07 | End: 2020-07-07 | Stop reason: SDUPTHER

## 2020-04-07 NOTE — TELEPHONE ENCOUNTER
"Called and spoke with pt. Patient was notified of rx refill and that he is due for his follow up. Patient declined being scheduled at this time, because he would rather wait to come into the office for his annual. Patient stated "I have set a reminder and I will call back to schedule at a later time." I voiced understanding.     Recall put in to remind him as well.   "

## 2020-04-07 NOTE — TELEPHONE ENCOUNTER
Rx refilled. He may be a good candidate for a virtual visit. He could get lab in [place] or [place]

## 2020-04-07 NOTE — TELEPHONE ENCOUNTER
----- Message from Miracle Leyva MA sent at 4/7/2020  9:08 AM CDT -----  Patient is requesting a refill:    acarbose (PRECOSE) 50 MG Tab 270 tablet 3 4/23/2019    Sig - Route: Take 1 tablet (50 mg total) by mouth 3 (three) times daily with meals. - Oral     Preferred Pharmacy:   [facility]  [address] [address]  Phone: [phone] Fax: [phone]    Please return call:  [phone]

## 2020-05-06 ENCOUNTER — PATIENT MESSAGE (OUTPATIENT)
Dept: ADMINISTRATIVE | Facility: HOSPITAL | Age: 65
End: 2020-05-06

## 2020-06-11 ENCOUNTER — PATIENT OUTREACH (OUTPATIENT)
Dept: ADMINISTRATIVE | Facility: HOSPITAL | Age: 65
End: 2020-06-11

## 2020-06-11 DIAGNOSIS — M10.9 GOUT, UNSPECIFIED CAUSE, UNSPECIFIED CHRONICITY, UNSPECIFIED SITE: ICD-10-CM

## 2020-06-11 RX ORDER — ALLOPURINOL 300 MG/1
300 TABLET ORAL DAILY
Qty: 90 TABLET | Refills: 3 | Status: SHIPPED | OUTPATIENT
Start: 2020-06-11 | End: 2021-06-07 | Stop reason: SDUPTHER

## 2020-06-11 NOTE — PROGRESS NOTES
Chart review completed 06/11/2020.  Care Everywhere updates requested and reviewed.  Immunizations reconciled. Media reviewed.     Lab estrellita, and quest reviewed.  Message sent to patient's portal.    Health Maintenance Due   Topic Date Due    Foot Exam  09/21/1965    HIV Screening  09/21/1969    Shingles Vaccine (1 of 2) 09/21/2004    Colonoscopy  05/02/2015    High Dose Statin  03/21/2019    Aspirin/Antiplatelet Therapy  03/21/2019    Hemoglobin A1c  10/08/2019    Lipid Panel  03/25/2020

## 2020-06-11 NOTE — TELEPHONE ENCOUNTER
----- Message from Royal GRIGGS Francisco sent at 6/11/2020  8:19 AM CDT -----  Contact: same  Type:  RX Refill Request    Who Called:  patient  Refill or New Rx:  new  RX Name and Strength:  allopurinol (ZYLOPRIM) 300 MG tablet  How is the patient currently taking it? (ex. 1XDay):  Take 1 tablet (300 mg total) by mouth once daily. - Oral  Is this a 30 day or 90 day RX:  90 tablet 3 6/24/2019   Preferred Pharmacy with phone number:    [facility] [address] [address]  Phone: [phone] Fax: [phone]  Ordering Provider:  Neil Bautista Call Back Number:  [phone]  Additional Information:  n/a

## 2020-06-23 ENCOUNTER — OFFICE VISIT (OUTPATIENT)
Dept: FAMILY MEDICINE | Facility: CLINIC | Age: 65
End: 2020-06-23
Payer: MEDICARE

## 2020-06-23 VITALS
OXYGEN SATURATION: 98 % | HEART RATE: 54 BPM | HEIGHT: 74 IN | SYSTOLIC BLOOD PRESSURE: 122 MMHG | BODY MASS INDEX: 38.77 KG/M2 | DIASTOLIC BLOOD PRESSURE: 72 MMHG | RESPIRATION RATE: 18 BRPM | WEIGHT: 302.13 LBS | TEMPERATURE: 99 F

## 2020-06-23 DIAGNOSIS — M1A.9XX0 CHRONIC GOUT WITHOUT TOPHUS, UNSPECIFIED CAUSE, UNSPECIFIED SITE: ICD-10-CM

## 2020-06-23 DIAGNOSIS — I10 ESSENTIAL HYPERTENSION: ICD-10-CM

## 2020-06-23 DIAGNOSIS — Z00.00 HEALTH MAINTENANCE EXAMINATION: ICD-10-CM

## 2020-06-23 DIAGNOSIS — E11.9 TYPE 2 DIABETES MELLITUS WITHOUT COMPLICATION, WITHOUT LONG-TERM CURRENT USE OF INSULIN: Primary | ICD-10-CM

## 2020-06-23 DIAGNOSIS — E66.01 MORBID OBESITY: ICD-10-CM

## 2020-06-23 DIAGNOSIS — Z12.11 COLON CANCER SCREENING: ICD-10-CM

## 2020-06-23 DIAGNOSIS — Z95.1 S/P CABG (CORONARY ARTERY BYPASS GRAFT): ICD-10-CM

## 2020-06-23 PROCEDURE — 3078F DIAST BP <80 MM HG: CPT | Mod: S$GLB,,, | Performed by: FAMILY MEDICINE

## 2020-06-23 PROCEDURE — 3074F SYST BP LT 130 MM HG: CPT | Mod: S$GLB,,, | Performed by: FAMILY MEDICINE

## 2020-06-23 PROCEDURE — 3078F PR MOST RECENT DIASTOLIC BLOOD PRESSURE < 80 MM HG: ICD-10-PCS | Mod: S$GLB,,, | Performed by: FAMILY MEDICINE

## 2020-06-23 PROCEDURE — 1101F PR PT FALLS ASSESS DOC 0-1 FALLS W/OUT INJ PAST YR: ICD-10-PCS | Mod: S$GLB,,, | Performed by: FAMILY MEDICINE

## 2020-06-23 PROCEDURE — 2024F PR 7 FIELD PHOTOS WITH INTERP/ REVIEW: ICD-10-PCS | Mod: S$GLB,,, | Performed by: FAMILY MEDICINE

## 2020-06-23 PROCEDURE — 99999 PR PBB SHADOW E&M-EST. PATIENT-LVL IV: CPT | Mod: PBBFAC,,, | Performed by: FAMILY MEDICINE

## 2020-06-23 PROCEDURE — 3008F BODY MASS INDEX DOCD: CPT | Mod: S$GLB,,, | Performed by: FAMILY MEDICINE

## 2020-06-23 PROCEDURE — 99999 PR PBB SHADOW E&M-EST. PATIENT-LVL IV: ICD-10-PCS | Mod: PBBFAC,,, | Performed by: FAMILY MEDICINE

## 2020-06-23 PROCEDURE — 3008F PR BODY MASS INDEX (BMI) DOCUMENTED: ICD-10-PCS | Mod: S$GLB,,, | Performed by: FAMILY MEDICINE

## 2020-06-23 PROCEDURE — 2024F 7 FLD RTA PHOTO EVC RTNOPTHY: CPT | Mod: S$GLB,,, | Performed by: FAMILY MEDICINE

## 2020-06-23 PROCEDURE — 99499 RISK ADDL DX/OHS AUDIT: ICD-10-PCS | Mod: S$GLB,,, | Performed by: FAMILY MEDICINE

## 2020-06-23 PROCEDURE — 1101F PT FALLS ASSESS-DOCD LE1/YR: CPT | Mod: S$GLB,,, | Performed by: FAMILY MEDICINE

## 2020-06-23 PROCEDURE — 99214 OFFICE O/P EST MOD 30 MIN: CPT | Mod: S$GLB,,, | Performed by: FAMILY MEDICINE

## 2020-06-23 PROCEDURE — 3074F PR MOST RECENT SYSTOLIC BLOOD PRESSURE < 130 MM HG: ICD-10-PCS | Mod: S$GLB,,, | Performed by: FAMILY MEDICINE

## 2020-06-23 PROCEDURE — 99214 PR OFFICE/OUTPT VISIT, EST, LEVL IV, 30-39 MIN: ICD-10-PCS | Mod: S$GLB,,, | Performed by: FAMILY MEDICINE

## 2020-06-23 PROCEDURE — 99499 UNLISTED E&M SERVICE: CPT | Mod: S$GLB,,, | Performed by: FAMILY MEDICINE

## 2020-06-23 NOTE — PROGRESS NOTES
"Subjective:       Patient ID: Anthony Kline is a 64 y.o. male.    Chief Complaint: Annual Exam    Follow-up diabetes.  He is followed by and nephrologist as well as cardiologist.  His creatinine has been going up somewhat.  His diabetes is treated with Precose 50 mg t.i.d..  He is due for lab work.  He has gained some weight.  He he has gone from 291 up to 302.  His daughter will be having a baby in August and he wants to get a corona virus test prior to that.  No gout attacks on allopurinol 300 mg.      Review of Systems   Constitutional: Positive for unexpected weight change. Negative for fever.   Eyes: Negative for visual disturbance.   Respiratory: Negative for cough and shortness of breath.    Cardiovascular: Positive for leg swelling. Negative for chest pain and palpitations.         Objective:     Blood pressure 122/72, pulse (!) 54, temperature 98.6 °F (37 °C), temperature source Temporal, resp. rate 18, height 6' 2" (1.88 m), weight (!) 137.1 kg (302 lb 2.3 oz), SpO2 98 %.      Physical Exam  Neck:      Vascular: No carotid bruit.   Cardiovascular:      Rate and Rhythm: Normal rate and regular rhythm.      Pulses:           Dorsalis pedis pulses are 1+ on the right side and 1+ on the left side.        Posterior tibial pulses are 1+ on the right side and 1+ on the left side.      Heart sounds: No murmur.   Pulmonary:      Effort: Pulmonary effort is normal.      Breath sounds: Normal breath sounds. No rales.   Musculoskeletal:      Right lower leg: Edema ( 1+) present.      Left lower leg: Edema (1+) present.      Right foot: Bunion present. No deformity.      Left foot: Bunion present. No deformity.   Feet:      Right foot:      Protective Sensation: 4 sites tested. 4 sites sensed.      Skin integrity: No ulcer.      Left foot:      Protective Sensation: 4 sites tested. 4 sites sensed.      Skin integrity: No ulcer.   Lymphadenopathy:      Cervical: No cervical adenopathy.   Skin:     Comments: " Onychomycosis         Assessment:       1. Type 2 diabetes mellitus without complication, without long-term current use of insulin    2. S/P CABG (coronary artery bypass graft)    3. Essential hypertension    4. Morbid obesity    5. Chronic gout without tophus, unspecified cause, unspecified site    6. Health maintenance examination    7. Colon cancer screening        Plan:       Cologuard ordered.  Continue current medication.  Lab work today.

## 2020-06-24 NOTE — PROGRESS NOTES
Patient, Anthony Kline (MRN #[id number]), presented with a recent Estimated Glumerular Filtration Rate (EGFR) between 30 and 45 consistent with the definition of chronic kidney disease stage 3 - moderate (ICD10 - N18.3).    eGFR if non    Date Value Ref Range Status   03/25/2019 41.7 (A) >60 mL/min/1.73 m^2 Final     Comment:     Calculation used to obtain the estimated glomerular filtration  rate (eGFR) is the CKD-EPI equation.          The patient's chronic kidney disease stage 3 was monitored, evaluated, addressed and/or treated. This addendum to the medical record is made on 06/24/2020. He is followed by a nephrologist in [place].

## 2020-06-25 ENCOUNTER — HISTORICAL (OUTPATIENT)
Dept: LAB | Facility: HOSPITAL | Age: 65
End: 2020-06-25

## 2020-06-25 LAB
CHOLEST SERPL-MCNC: 156 MG/DL (ref 0–200)
CHOLEST/HDLC SERPL: 4.5 {RATIO} (ref 0–5)
EST. AVERAGE GLUCOSE BLD GHB EST-MCNC: 160 MG/DL
HBA1C MFR BLD: 7.2 % (ref 4.5–6.2)
HDLC SERPL-MCNC: 35 MG/DL (ref 40–60)
HIV 1+2 AB+HIV1 P24 AG SERPL QL IA: NONREACTIVE
LDLC SERPL CALC-MCNC: 75 MG/DL (ref 0–129)
TRIGL SERPL-MCNC: 230 MG/DL
URATE SERPL-MCNC: 5.8 MG/DL (ref 3.4–7)
VLDLC SERPL CALC-MCNC: 46 MG/DL

## 2020-07-01 LAB — FECAL GLOBIN BY IMMUNOCHEM. (MEDICARE): NEGATIVE

## 2020-07-07 DIAGNOSIS — E11.42 TYPE 2 DIABETES MELLITUS WITH DIABETIC POLYNEUROPATHY, WITHOUT LONG-TERM CURRENT USE OF INSULIN: ICD-10-CM

## 2020-07-07 NOTE — TELEPHONE ENCOUNTER
----- Message from Mana Carrington sent at 7/7/2020 11:03 AM CDT -----  Type:  RX Refill Request    Who Called:  Anthony  Refill or New Rx:  refill  RX Name and Strength:  acarbose (PRECOSE) 50 MG Tab  How is the patient currently taking it? (ex. 1XDay):  one 3 times daily  Is this a 30 day or 90 day RX:  90  Preferred Pharmacy with phone number:    [facility]  [address] [address]  Phone: [phone] Fax: [phone]  Local or Mail Order:  mail  Ordering Provider:  Dr Neil Bautista Call Back Number:  [phone]  Additional Information:  Thank you!

## 2020-07-08 RX ORDER — ACARBOSE 50 MG/1
50 TABLET ORAL
Qty: 270 TABLET | Refills: 3 | Status: SHIPPED | OUTPATIENT
Start: 2020-07-08 | End: 2021-07-12 | Stop reason: SDUPTHER

## 2020-07-20 ENCOUNTER — TELEPHONE (OUTPATIENT)
Dept: FAMILY MEDICINE | Facility: CLINIC | Age: 65
End: 2020-07-20

## 2020-07-20 NOTE — TELEPHONE ENCOUNTER
----- Message from Sheela Urena sent at 7/20/2020  1:16 PM CDT -----  Regarding: Requesting Call Back  Contact: Patient  Type: Needs Medical Advice    Who Called:  Patient    Best Call Back Number: [phone]    Additional Information:   Patient requesting a call back in regards to the whooping cough vaccine.  Daughter is having a baby within the next few weeks & he was told it is good to get this before hand?  Also has questions about lab work  Please call to advise, thank you!

## 2020-07-21 NOTE — TELEPHONE ENCOUNTER
I don't see results from Lab. I thought he was going to get it drawn the day I saw him.  I will check on cologuard on Thursday when I am in clinic.

## 2020-07-23 ENCOUNTER — PATIENT MESSAGE (OUTPATIENT)
Dept: FAMILY MEDICINE | Facility: CLINIC | Age: 65
End: 2020-07-23

## 2020-08-04 ENCOUNTER — HISTORICAL (OUTPATIENT)
Dept: ADMINISTRATIVE | Facility: HOSPITAL | Age: 65
End: 2020-08-04

## 2020-08-04 ENCOUNTER — TELEPHONE (OUTPATIENT)
Dept: FAMILY MEDICINE | Facility: CLINIC | Age: 65
End: 2020-08-04

## 2020-08-04 NOTE — TELEPHONE ENCOUNTER
----- Message from Leyda Villalba sent at 8/4/2020  9:29 AM CDT -----  Type: Needs Medical Advice  Who Called:  Patient   Best Call Back Number: [phone]  Additional Information: Per patient in [place] trying to get a COVID test and the nurse doesn't know how it was ordered, requesting a call back-please advise-thank you

## 2020-08-04 NOTE — TELEPHONE ENCOUNTER
Pt states that Dr Trejo wrote order on script pas to be tested for COVID due to grand baby being born. Fax new order [phone]

## 2020-08-05 ENCOUNTER — HISTORICAL (OUTPATIENT)
Dept: ADMINISTRATIVE | Facility: HOSPITAL | Age: 65
End: 2020-08-05

## 2020-08-06 ENCOUNTER — TELEPHONE (OUTPATIENT)
Dept: ADMINISTRATIVE | Facility: HOSPITAL | Age: 65
End: 2020-08-06

## 2020-08-18 ENCOUNTER — PATIENT OUTREACH (OUTPATIENT)
Dept: ADMINISTRATIVE | Facility: HOSPITAL | Age: 65
End: 2020-08-18

## 2020-08-18 NOTE — PROGRESS NOTES
Non-compliant report chart audits. Chart review completed 08/18/2020 for HM test overdue.    Care Everywhere and media, updates requested and reviewed.        Not in [facility] or [facility]

## 2020-10-13 ENCOUNTER — HISTORICAL (OUTPATIENT)
Dept: ADMINISTRATIVE | Facility: HOSPITAL | Age: 65
End: 2020-10-13

## 2020-10-13 LAB
ALBUMIN SERPL-MCNC: 3.7 GM/DL (ref 3.4–5)
ALBUMIN/GLOB SERPL: 1.6 RATIO (ref 1.1–2)
ALP SERPL-CCNC: 72 UNIT/L (ref 40–150)
ALT SERPL-CCNC: 24 UNIT/L (ref 0–55)
AST SERPL-CCNC: 18 UNIT/L (ref 5–34)
BILIRUB SERPL-MCNC: 0.7 MG/DL
BILIRUBIN DIRECT+TOT PNL SERPL-MCNC: 0.2 MG/DL (ref 0–0.5)
BILIRUBIN DIRECT+TOT PNL SERPL-MCNC: 0.5 MG/DL (ref 0–0.8)
BUN SERPL-MCNC: 24.8 MG/DL (ref 8.4–25.7)
CALCIUM SERPL-MCNC: 7.3 MG/DL (ref 8.8–10)
CHLORIDE SERPL-SCNC: 108 MMOL/L (ref 98–107)
CO2 SERPL-SCNC: 22 MMOL/L (ref 23–31)
CREAT SERPL-MCNC: 1.88 MG/DL (ref 0.73–1.18)
ERYTHROCYTE [DISTWIDTH] IN BLOOD BY AUTOMATED COUNT: 13.9 % (ref 11.5–17)
GLOBULIN SER-MCNC: 2.3 GM/DL (ref 2.4–3.5)
GLUCOSE SERPL-MCNC: 147 MG/DL (ref 82–115)
HCT VFR BLD AUTO: 40.2 % (ref 42–52)
HGB BLD-MCNC: 13.3 GM/DL (ref 14–18)
MAGNESIUM SERPL-MCNC: 1.8 MG/DL (ref 1.6–2.6)
MCH RBC QN AUTO: 32.1 PG (ref 27–31)
MCHC RBC AUTO-ENTMCNC: 33.1 GM/DL (ref 33–36)
MCV RBC AUTO: 97.1 FL (ref 80–94)
PHOSPHATE SERPL-MCNC: 2.8 MG/DL (ref 2.3–4.7)
PLATELET # BLD AUTO: 142 X10(3)/MCL (ref 130–400)
PMV BLD AUTO: 11.9 FL (ref 9.4–12.4)
POTASSIUM SERPL-SCNC: 5.1 MMOL/L (ref 3.5–5.1)
PROT SERPL-MCNC: 6 GM/DL (ref 5.8–7.6)
PTH-INTACT SERPL-MCNC: 197.5 PG/ML (ref 8.7–77.1)
RBC # BLD AUTO: 4.14 X10(6)/MCL (ref 4.7–6.1)
SODIUM SERPL-SCNC: 140 MMOL/L (ref 136–145)
WBC # SPEC AUTO: 6.8 X10(3)/MCL (ref 4.5–11.5)

## 2020-11-17 ENCOUNTER — HISTORICAL (OUTPATIENT)
Dept: ADMINISTRATIVE | Facility: HOSPITAL | Age: 65
End: 2020-11-17

## 2020-11-17 LAB
BUN SERPL-MCNC: 25.4 MG/DL (ref 8.4–25.7)
CALCIUM SERPL-MCNC: 9.1 MG/DL (ref 8.8–10)
CHLORIDE SERPL-SCNC: 109 MMOL/L (ref 98–107)
CO2 SERPL-SCNC: 24 MMOL/L (ref 23–31)
CREAT SERPL-MCNC: 1.99 MG/DL (ref 0.73–1.18)
CREAT/UREA NIT SERPL: 13
EST. AVERAGE GLUCOSE BLD GHB EST-MCNC: 154.2 MG/DL
GLUCOSE SERPL-MCNC: 152 MG/DL (ref 82–115)
HBA1C MFR BLD: 7 %
HBA1C MFR BLD: 7 % (ref 4–6)
POTASSIUM SERPL-SCNC: 5.2 MMOL/L (ref 3.5–5.1)
PTH-INTACT SERPL-MCNC: 169.2 PG/ML (ref 8.7–77.1)
SODIUM SERPL-SCNC: 145 MMOL/L (ref 136–145)

## 2020-12-29 ENCOUNTER — PATIENT OUTREACH (OUTPATIENT)
Dept: ADMINISTRATIVE | Facility: HOSPITAL | Age: 65
End: 2020-12-29

## 2020-12-29 NOTE — PROGRESS NOTES
Non-compliant report chart audits. Chart review completed 12/29/2020 for HM test overdue.    Care Everywhere and media, updates requested and reviewed.      HM updated with external Hemoglobin A1C

## 2021-05-03 ENCOUNTER — HISTORICAL (OUTPATIENT)
Dept: ADMINISTRATIVE | Facility: HOSPITAL | Age: 66
End: 2021-05-03

## 2021-05-03 LAB
ABS NEUT (OLG): 6.48 X10(3)/MCL (ref 2.1–9.2)
ALBUMIN SERPL-MCNC: 3.8 GM/DL (ref 3.4–4.8)
ALBUMIN/GLOB SERPL: 1.4 RATIO (ref 1.1–2)
ALP SERPL-CCNC: 81 UNIT/L (ref 40–150)
ALT SERPL-CCNC: 26 UNIT/L (ref 0–55)
AST SERPL-CCNC: 16 UNIT/L (ref 5–34)
BASOPHILS # BLD AUTO: 0.1 X10(3)/MCL (ref 0–0.2)
BASOPHILS NFR BLD AUTO: 1 %
BILIRUB SERPL-MCNC: 1 MG/DL
BILIRUBIN DIRECT+TOT PNL SERPL-MCNC: 0.3 MG/DL (ref 0–0.5)
BILIRUBIN DIRECT+TOT PNL SERPL-MCNC: 0.7 MG/DL (ref 0–0.8)
BUN SERPL-MCNC: 19.8 MG/DL (ref 8.4–25.7)
CALCIUM SERPL-MCNC: 9.6 MG/DL (ref 8.8–10)
CHLORIDE SERPL-SCNC: 107 MMOL/L (ref 98–107)
CO2 SERPL-SCNC: 24 MMOL/L (ref 23–31)
CREAT SERPL-MCNC: 2.02 MG/DL (ref 0.73–1.18)
CREAT UR-MCNC: 211.8 MG/DL (ref 58–161)
DEPRECATED CALCIDIOL+CALCIFEROL SERPL-MC: 18.4 NG/ML (ref 30–80)
EOSINOPHIL # BLD AUTO: 0.2 X10(3)/MCL (ref 0–0.9)
EOSINOPHIL NFR BLD AUTO: 2 %
ERYTHROCYTE [DISTWIDTH] IN BLOOD BY AUTOMATED COUNT: 13.7 % (ref 11.5–17)
GLOBULIN SER-MCNC: 2.7 GM/DL (ref 2.4–3.5)
GLUCOSE SERPL-MCNC: 218 MG/DL (ref 82–115)
HCT VFR BLD AUTO: 43.1 % (ref 42–52)
HGB BLD-MCNC: 14.4 GM/DL (ref 14–18)
LYMPHOCYTES # BLD AUTO: 1.8 X10(3)/MCL (ref 0.6–4.6)
LYMPHOCYTES NFR BLD AUTO: 19 %
MAGNESIUM SERPL-MCNC: 1.9 MG/DL (ref 1.6–2.6)
MCH RBC QN AUTO: 32 PG (ref 27–31)
MCHC RBC AUTO-ENTMCNC: 33.4 GM/DL (ref 33–36)
MCV RBC AUTO: 95.8 FL (ref 80–94)
MONOCYTES # BLD AUTO: 0.8 X10(3)/MCL (ref 0.1–1.3)
MONOCYTES NFR BLD AUTO: 8 %
NEUTROPHILS # BLD AUTO: 6.48 X10(3)/MCL (ref 2.1–9.2)
NEUTROPHILS NFR BLD AUTO: 70 %
PLATELET # BLD AUTO: 200 X10(3)/MCL (ref 130–400)
PMV BLD AUTO: 11.8 FL (ref 9.4–12.4)
POTASSIUM SERPL-SCNC: 4.8 MMOL/L (ref 3.5–5.1)
PROT SERPL-MCNC: 6.5 GM/DL (ref 5.8–7.6)
PROT UR STRIP-MCNC: 21.8 MG/DL
PROT/CREAT UR-RTO: 102.9 MG/GM CR
PTH-INTACT SERPL-MCNC: 96.7 PG/ML (ref 8.7–77.1)
RBC # BLD AUTO: 4.5 X10(6)/MCL (ref 4.7–6.1)
SODIUM SERPL-SCNC: 141 MMOL/L (ref 136–145)
WBC # SPEC AUTO: 9.3 X10(3)/MCL (ref 4.5–11.5)

## 2021-05-12 ENCOUNTER — PATIENT MESSAGE (OUTPATIENT)
Dept: RESEARCH | Facility: HOSPITAL | Age: 66
End: 2021-05-12

## 2021-06-07 DIAGNOSIS — M10.9 GOUT, UNSPECIFIED CAUSE, UNSPECIFIED CHRONICITY, UNSPECIFIED SITE: ICD-10-CM

## 2021-06-07 RX ORDER — ALLOPURINOL 300 MG/1
300 TABLET ORAL DAILY
Qty: 90 TABLET | Refills: 0 | Status: SHIPPED | OUTPATIENT
Start: 2021-06-07 | End: 2021-09-13 | Stop reason: SDUPTHER

## 2021-07-07 ENCOUNTER — PATIENT MESSAGE (OUTPATIENT)
Dept: ADMINISTRATIVE | Facility: HOSPITAL | Age: 66
End: 2021-07-07

## 2021-07-12 DIAGNOSIS — E11.42 TYPE 2 DIABETES MELLITUS WITH DIABETIC POLYNEUROPATHY, WITHOUT LONG-TERM CURRENT USE OF INSULIN: ICD-10-CM

## 2021-07-12 RX ORDER — ACARBOSE 50 MG/1
50 TABLET ORAL
Qty: 270 TABLET | Refills: 3 | Status: SHIPPED | OUTPATIENT
Start: 2021-07-12

## 2021-09-07 ENCOUNTER — HISTORICAL (OUTPATIENT)
Dept: LAB | Facility: HOSPITAL | Age: 66
End: 2021-09-07

## 2021-09-07 LAB
ABS NEUT (OLG): 5.84 X10(3)/MCL (ref 2.1–9.2)
ALBUMIN SERPL-MCNC: 3.7 GM/DL (ref 3.4–4.8)
ALBUMIN/GLOB SERPL: 1.3 RATIO (ref 1.1–2)
ALP SERPL-CCNC: 89 UNIT/L (ref 40–150)
ALT SERPL-CCNC: 36 UNIT/L (ref 0–55)
AST SERPL-CCNC: 18 UNIT/L (ref 5–34)
BASOPHILS # BLD AUTO: 0.1 X10(3)/MCL (ref 0–0.2)
BASOPHILS NFR BLD AUTO: 1 %
BILIRUB SERPL-MCNC: 0.9 MG/DL
BILIRUBIN DIRECT+TOT PNL SERPL-MCNC: 0.3 MG/DL (ref 0–0.5)
BILIRUBIN DIRECT+TOT PNL SERPL-MCNC: 0.6 MG/DL (ref 0–0.8)
BUN SERPL-MCNC: 25.8 MG/DL (ref 8.4–25.7)
CALCIUM SERPL-MCNC: 8.7 MG/DL (ref 8.8–10)
CHLORIDE SERPL-SCNC: 109 MMOL/L (ref 98–107)
CO2 SERPL-SCNC: 22 MMOL/L (ref 23–31)
CREAT SERPL-MCNC: 2.3 MG/DL (ref 0.73–1.18)
DEPRECATED CALCIDIOL+CALCIFEROL SERPL-MC: 21 NG/ML (ref 30–80)
EOSINOPHIL # BLD AUTO: 0.3 X10(3)/MCL (ref 0–0.9)
EOSINOPHIL NFR BLD AUTO: 3 %
ERYTHROCYTE [DISTWIDTH] IN BLOOD BY AUTOMATED COUNT: 13.3 % (ref 11.5–17)
GLOBULIN SER-MCNC: 2.8 GM/DL (ref 2.4–3.5)
GLUCOSE SERPL-MCNC: 264 MG/DL (ref 82–115)
HCT VFR BLD AUTO: 41.6 % (ref 42–52)
HGB BLD-MCNC: 14.6 GM/DL (ref 14–18)
IMM GRANULOCYTES # BLD AUTO: 0.04 % (ref 0–0.02)
IMM GRANULOCYTES NFR BLD AUTO: 0.4 % (ref 0–0.43)
LYMPHOCYTES # BLD AUTO: 2.3 X10(3)/MCL (ref 0.6–4.6)
LYMPHOCYTES NFR BLD AUTO: 25 %
MAGNESIUM SERPL-MCNC: 1.8 MG/DL (ref 1.6–2.6)
MCH RBC QN AUTO: 33.3 PG (ref 27–31)
MCHC RBC AUTO-ENTMCNC: 35.1 GM/DL (ref 33–36)
MCV RBC AUTO: 95 FL (ref 80–94)
MONOCYTES # BLD AUTO: 0.7 X10(3)/MCL (ref 0.1–1.3)
MONOCYTES NFR BLD AUTO: 8 %
NEUTROPHILS # BLD AUTO: 5.84 X10(3)/MCL (ref 1.4–7.9)
NEUTROPHILS NFR BLD AUTO: 63 %
PHOSPHATE SERPL-MCNC: 3.3 MG/DL (ref 2.3–4.7)
PLATELET # BLD AUTO: 181 X10(3)/MCL (ref 130–400)
PMV BLD AUTO: 10.7 FL (ref 9.4–12.4)
POTASSIUM SERPL-SCNC: 4.7 MMOL/L (ref 3.5–5.1)
PROT SERPL-MCNC: 6.5 GM/DL (ref 5.8–7.6)
RBC # BLD AUTO: 4.38 X10(6)/MCL (ref 4.7–6.1)
SODIUM SERPL-SCNC: 140 MMOL/L (ref 136–145)
WBC # SPEC AUTO: 9.3 X10(3)/MCL (ref 4.5–11.5)

## 2021-09-13 DIAGNOSIS — M10.9 GOUT, UNSPECIFIED CAUSE, UNSPECIFIED CHRONICITY, UNSPECIFIED SITE: ICD-10-CM

## 2021-09-14 ENCOUNTER — TELEPHONE (OUTPATIENT)
Dept: FAMILY MEDICINE | Facility: CLINIC | Age: 66
End: 2021-09-14

## 2021-09-14 RX ORDER — ALLOPURINOL 300 MG/1
300 TABLET ORAL DAILY
Qty: 90 TABLET | Refills: 0 | Status: SHIPPED | OUTPATIENT
Start: 2021-09-14 | End: 2021-12-06 | Stop reason: SDUPTHER

## 2021-10-05 ENCOUNTER — TELEPHONE (OUTPATIENT)
Dept: PRIMARY CARE CLINIC | Facility: CLINIC | Age: 66
End: 2021-10-05

## 2021-10-05 ENCOUNTER — LAB VISIT (OUTPATIENT)
Dept: LAB | Facility: HOSPITAL | Age: 66
End: 2021-10-05
Attending: FAMILY MEDICINE
Payer: MEDICARE

## 2021-10-05 ENCOUNTER — OFFICE VISIT (OUTPATIENT)
Dept: FAMILY MEDICINE | Facility: CLINIC | Age: 66
End: 2021-10-05
Payer: MEDICARE

## 2021-10-05 VITALS
DIASTOLIC BLOOD PRESSURE: 70 MMHG | SYSTOLIC BLOOD PRESSURE: 128 MMHG | HEART RATE: 70 BPM | HEIGHT: 74 IN | WEIGHT: 299.63 LBS | BODY MASS INDEX: 38.45 KG/M2

## 2021-10-05 DIAGNOSIS — I10 ESSENTIAL HYPERTENSION: Primary | ICD-10-CM

## 2021-10-05 DIAGNOSIS — N18.32 TYPE 2 DIABETES MELLITUS WITH STAGE 3B CHRONIC KIDNEY DISEASE, WITHOUT LONG-TERM CURRENT USE OF INSULIN: ICD-10-CM

## 2021-10-05 DIAGNOSIS — E11.22 TYPE 2 DIABETES MELLITUS WITH STAGE 3B CHRONIC KIDNEY DISEASE, WITHOUT LONG-TERM CURRENT USE OF INSULIN: ICD-10-CM

## 2021-10-05 DIAGNOSIS — C44.219 BASAL CELL CARCINOMA (BCC) OF SKIN OF LEFT EAR: ICD-10-CM

## 2021-10-05 DIAGNOSIS — N18.32 STAGE 3B CHRONIC KIDNEY DISEASE: ICD-10-CM

## 2021-10-05 DIAGNOSIS — Z95.1 S/P CABG (CORONARY ARTERY BYPASS GRAFT): ICD-10-CM

## 2021-10-05 LAB
ALBUMIN SERPL BCP-MCNC: 3.7 G/DL (ref 3.5–5.2)
ALP SERPL-CCNC: 93 U/L (ref 55–135)
ALT SERPL W/O P-5'-P-CCNC: 50 U/L (ref 10–44)
ANION GAP SERPL CALC-SCNC: 12 MMOL/L (ref 8–16)
AST SERPL-CCNC: 22 U/L (ref 10–40)
BILIRUB SERPL-MCNC: 1.2 MG/DL (ref 0.1–1)
BUN SERPL-MCNC: 25 MG/DL (ref 8–23)
CALCIUM SERPL-MCNC: 9.3 MG/DL (ref 8.7–10.5)
CHLORIDE SERPL-SCNC: 98 MMOL/L (ref 95–110)
CHOLEST SERPL-MCNC: 157 MG/DL (ref 120–199)
CHOLEST/HDLC SERPL: 4.9 {RATIO} (ref 2–5)
CO2 SERPL-SCNC: 21 MMOL/L (ref 23–29)
CREAT SERPL-MCNC: 2.7 MG/DL (ref 0.5–1.4)
EST. GFR  (AFRICAN AMERICAN): 27 ML/MIN/1.73 M^2
EST. GFR  (NON AFRICAN AMERICAN): 23.3 ML/MIN/1.73 M^2
ESTIMATED AVG GLUCOSE: 260 MG/DL (ref 68–131)
GLUCOSE SERPL-MCNC: 483 MG/DL (ref 70–110)
HBA1C MFR BLD: 10.7 % (ref 4–5.6)
HDLC SERPL-MCNC: 32 MG/DL (ref 40–75)
HDLC SERPL: 20.4 % (ref 20–50)
LDLC SERPL CALC-MCNC: ABNORMAL MG/DL (ref 63–159)
NONHDLC SERPL-MCNC: 125 MG/DL
POTASSIUM SERPL-SCNC: 4.7 MMOL/L (ref 3.5–5.1)
PROT SERPL-MCNC: 6.8 G/DL (ref 6–8.4)
SODIUM SERPL-SCNC: 131 MMOL/L (ref 136–145)
TRIGL SERPL-MCNC: 402 MG/DL (ref 30–150)

## 2021-10-05 PROCEDURE — 99214 PR OFFICE/OUTPT VISIT, EST, LEVL IV, 30-39 MIN: ICD-10-PCS | Mod: 25,S$GLB,, | Performed by: FAMILY MEDICINE

## 2021-10-05 PROCEDURE — 99214 OFFICE O/P EST MOD 30 MIN: CPT | Mod: 25,S$GLB,, | Performed by: FAMILY MEDICINE

## 2021-10-05 PROCEDURE — 99214 OFFICE O/P EST MOD 30 MIN: CPT | Mod: PBBFAC,PN | Performed by: FAMILY MEDICINE

## 2021-10-05 PROCEDURE — 80053 COMPREHEN METABOLIC PANEL: CPT | Performed by: FAMILY MEDICINE

## 2021-10-05 PROCEDURE — 3051F HG A1C>EQUAL 7.0%<8.0%: CPT | Mod: S$GLB,,, | Performed by: FAMILY MEDICINE

## 2021-10-05 PROCEDURE — 99999 PR PBB SHADOW E&M-EST. PATIENT-LVL IV: ICD-10-PCS | Mod: PBBFAC,,, | Performed by: FAMILY MEDICINE

## 2021-10-05 PROCEDURE — 11640 EXC F/E/E/N/L MAL+MRG 0.5CM<: CPT | Mod: S$GLB,,, | Performed by: FAMILY MEDICINE

## 2021-10-05 PROCEDURE — 88305 TISSUE EXAM BY PATHOLOGIST: CPT | Performed by: PATHOLOGY

## 2021-10-05 PROCEDURE — 99999 PR PBB SHADOW E&M-EST. PATIENT-LVL IV: CPT | Mod: PBBFAC,,, | Performed by: FAMILY MEDICINE

## 2021-10-05 PROCEDURE — 88305 TISSUE EXAM BY PATHOLOGIST: ICD-10-PCS | Mod: 26,,, | Performed by: PATHOLOGY

## 2021-10-05 PROCEDURE — 83036 HEMOGLOBIN GLYCOSYLATED A1C: CPT | Performed by: FAMILY MEDICINE

## 2021-10-05 PROCEDURE — 11640 PR EXC SKIN MALIG <0.5 CM FACE,FACIAL: ICD-10-PCS | Mod: S$GLB,,, | Performed by: FAMILY MEDICINE

## 2021-10-05 PROCEDURE — 3051F PR MOST RECENT HEMOGLOBIN A1C LEVEL 7.0 - < 8.0%: ICD-10-PCS | Mod: S$GLB,,, | Performed by: FAMILY MEDICINE

## 2021-10-05 PROCEDURE — 36415 COLL VENOUS BLD VENIPUNCTURE: CPT | Mod: PN | Performed by: FAMILY MEDICINE

## 2021-10-05 PROCEDURE — 88305 TISSUE EXAM BY PATHOLOGIST: CPT | Mod: 26,,, | Performed by: PATHOLOGY

## 2021-10-05 PROCEDURE — 80061 LIPID PANEL: CPT | Performed by: FAMILY MEDICINE

## 2021-10-05 RX ORDER — DAPAGLIFLOZIN 5 MG/1
5 TABLET, FILM COATED ORAL DAILY
Qty: 90 TABLET | Refills: 3 | Status: SHIPPED | OUTPATIENT
Start: 2021-10-05 | End: 2021-12-21 | Stop reason: DRUGHIGH

## 2021-10-05 RX ORDER — LISINOPRIL 10 MG/1
10 TABLET ORAL DAILY
COMMUNITY
End: 2021-12-21 | Stop reason: ALTCHOICE

## 2021-10-05 RX ORDER — CHOLECALCIFEROL (VITAMIN D3) 25 MCG
1000 TABLET ORAL DAILY
COMMUNITY

## 2021-10-06 ENCOUNTER — TELEPHONE (OUTPATIENT)
Dept: FAMILY MEDICINE | Facility: CLINIC | Age: 66
End: 2021-10-06

## 2021-10-06 DIAGNOSIS — E11.9 TYPE 2 DIABETES MELLITUS WITHOUT COMPLICATION, WITHOUT LONG-TERM CURRENT USE OF INSULIN: Primary | ICD-10-CM

## 2021-10-11 LAB
FINAL PATHOLOGIC DIAGNOSIS: NORMAL
GROSS: NORMAL
Lab: NORMAL
MICROSCOPIC EXAM: NORMAL

## 2021-10-14 ENCOUNTER — TELEPHONE (OUTPATIENT)
Dept: FAMILY MEDICINE | Facility: CLINIC | Age: 66
End: 2021-10-14

## 2021-10-14 DIAGNOSIS — D04.9 BASAL CELL CARCINOMA IN SITU OF SKIN: Primary | ICD-10-CM

## 2021-10-15 ENCOUNTER — PATIENT MESSAGE (OUTPATIENT)
Dept: FAMILY MEDICINE | Facility: CLINIC | Age: 66
End: 2021-10-15
Payer: MEDICARE

## 2021-10-18 ENCOUNTER — TELEPHONE (OUTPATIENT)
Dept: FAMILY MEDICINE | Facility: CLINIC | Age: 66
End: 2021-10-18

## 2021-10-20 ENCOUNTER — TELEPHONE (OUTPATIENT)
Dept: FAMILY MEDICINE | Facility: CLINIC | Age: 66
End: 2021-10-20

## 2021-10-21 ENCOUNTER — TELEPHONE (OUTPATIENT)
Dept: FAMILY MEDICINE | Facility: CLINIC | Age: 66
End: 2021-10-21

## 2021-10-22 ENCOUNTER — HISTORICAL (OUTPATIENT)
Dept: LAB | Facility: HOSPITAL | Age: 66
End: 2021-10-22

## 2021-10-22 LAB
ALBUMIN SERPL-MCNC: 4.1 GM/DL (ref 3.4–4.8)
ALBUMIN/GLOB SERPL: 1.5 RATIO (ref 1.1–2)
ALP SERPL-CCNC: 94 UNIT/L (ref 40–150)
ALT SERPL-CCNC: 37 UNIT/L (ref 0–55)
AST SERPL-CCNC: 21 UNIT/L (ref 5–34)
BILIRUB SERPL-MCNC: 1.2 MG/DL
BILIRUBIN DIRECT+TOT PNL SERPL-MCNC: 0.4 MG/DL (ref 0–0.5)
BILIRUBIN DIRECT+TOT PNL SERPL-MCNC: 0.8 MG/DL (ref 0–0.8)
BUN SERPL-MCNC: 39.5 MG/DL (ref 8.4–25.7)
CALCIUM SERPL-MCNC: 10.2 MG/DL (ref 8.8–10)
CHLORIDE SERPL-SCNC: 103 MMOL/L (ref 98–107)
CO2 SERPL-SCNC: 20 MMOL/L (ref 23–31)
CREAT SERPL-MCNC: 2.54 MG/DL (ref 0.73–1.18)
GLOBULIN SER-MCNC: 2.8 GM/DL (ref 2.4–3.5)
GLUCOSE SERPL-MCNC: 234 MG/DL (ref 82–115)
POTASSIUM SERPL-SCNC: 5.2 MMOL/L (ref 3.5–5.1)
PROT SERPL-MCNC: 6.9 GM/DL (ref 5.8–7.6)
SODIUM SERPL-SCNC: 135 MMOL/L (ref 136–145)

## 2021-11-18 DIAGNOSIS — E11.9 TYPE 2 DIABETES MELLITUS WITHOUT COMPLICATION, UNSPECIFIED WHETHER LONG TERM INSULIN USE: ICD-10-CM

## 2021-12-06 ENCOUNTER — PATIENT OUTREACH (OUTPATIENT)
Dept: ADMINISTRATIVE | Facility: HOSPITAL | Age: 66
End: 2021-12-06
Payer: MEDICARE

## 2021-12-06 ENCOUNTER — HISTORICAL (OUTPATIENT)
Dept: LAB | Facility: HOSPITAL | Age: 66
End: 2021-12-06

## 2021-12-06 DIAGNOSIS — M10.9 GOUT, UNSPECIFIED CAUSE, UNSPECIFIED CHRONICITY, UNSPECIFIED SITE: ICD-10-CM

## 2021-12-06 LAB
ALBUMIN SERPL-MCNC: 3.8 GM/DL (ref 3.4–4.8)
ALBUMIN/GLOB SERPL: 1.4 RATIO (ref 1.1–2)
ALP SERPL-CCNC: 60 UNIT/L (ref 40–150)
ALT SERPL-CCNC: 37 UNIT/L (ref 0–55)
AST SERPL-CCNC: 23 UNIT/L (ref 5–34)
BILIRUB SERPL-MCNC: 0.8 MG/DL
BILIRUBIN DIRECT+TOT PNL SERPL-MCNC: 0.3 MG/DL (ref 0–0.5)
BILIRUBIN DIRECT+TOT PNL SERPL-MCNC: 0.5 MG/DL (ref 0–0.8)
BUN SERPL-MCNC: 32 MG/DL (ref 8.4–25.7)
CALCIUM SERPL-MCNC: 9.5 MG/DL (ref 8.7–10.5)
CHLORIDE SERPL-SCNC: 107 MMOL/L (ref 98–107)
CO2 SERPL-SCNC: 24 MMOL/L (ref 23–31)
CREAT SERPL-MCNC: 2.91 MG/DL (ref 0.73–1.18)
GLOBULIN SER-MCNC: 2.7 GM/DL (ref 2.4–3.5)
GLUCOSE SERPL-MCNC: 213 MG/DL (ref 82–115)
POTASSIUM SERPL-SCNC: 5.2 MMOL/L (ref 3.5–5.1)
PROT SERPL-MCNC: 6.5 GM/DL (ref 5.8–7.6)
SODIUM SERPL-SCNC: 140 MMOL/L (ref 136–145)

## 2021-12-07 RX ORDER — ALLOPURINOL 300 MG/1
300 TABLET ORAL DAILY
Qty: 90 TABLET | Refills: 0 | Status: SHIPPED | OUTPATIENT
Start: 2021-12-07 | End: 2021-12-21 | Stop reason: SDUPTHER

## 2021-12-21 ENCOUNTER — OFFICE VISIT (OUTPATIENT)
Dept: FAMILY MEDICINE | Facility: CLINIC | Age: 66
End: 2021-12-21
Payer: MEDICARE

## 2021-12-21 DIAGNOSIS — I10 ESSENTIAL HYPERTENSION: ICD-10-CM

## 2021-12-21 DIAGNOSIS — E66.01 MORBID OBESITY: ICD-10-CM

## 2021-12-21 DIAGNOSIS — N18.32 STAGE 3B CHRONIC KIDNEY DISEASE: ICD-10-CM

## 2021-12-21 DIAGNOSIS — N18.32 TYPE 2 DIABETES MELLITUS WITH STAGE 3B CHRONIC KIDNEY DISEASE, WITHOUT LONG-TERM CURRENT USE OF INSULIN: Primary | ICD-10-CM

## 2021-12-21 DIAGNOSIS — M10.9 GOUT, UNSPECIFIED CAUSE, UNSPECIFIED CHRONICITY, UNSPECIFIED SITE: ICD-10-CM

## 2021-12-21 DIAGNOSIS — E11.22 TYPE 2 DIABETES MELLITUS WITH STAGE 3B CHRONIC KIDNEY DISEASE, WITHOUT LONG-TERM CURRENT USE OF INSULIN: Primary | ICD-10-CM

## 2021-12-21 DIAGNOSIS — Z95.1 S/P CABG (CORONARY ARTERY BYPASS GRAFT): ICD-10-CM

## 2021-12-21 PROCEDURE — 99214 OFFICE O/P EST MOD 30 MIN: CPT | Mod: 95,,, | Performed by: FAMILY MEDICINE

## 2021-12-21 PROCEDURE — 99214 PR OFFICE/OUTPT VISIT, EST, LEVL IV, 30-39 MIN: ICD-10-PCS | Mod: 95,,, | Performed by: FAMILY MEDICINE

## 2021-12-21 RX ORDER — DAPAGLIFLOZIN 10 MG/1
10 TABLET, FILM COATED ORAL DAILY
Qty: 90 TABLET | Refills: 3 | Status: SHIPPED | OUTPATIENT
Start: 2021-12-21

## 2021-12-21 RX ORDER — ALLOPURINOL 300 MG/1
300 TABLET ORAL DAILY
Qty: 90 TABLET | Refills: 3
Start: 2021-12-21 | End: 2022-03-08 | Stop reason: SDUPTHER

## 2021-12-22 ENCOUNTER — LAB VISIT (OUTPATIENT)
Dept: LAB | Facility: HOSPITAL | Age: 66
End: 2021-12-22
Attending: FAMILY MEDICINE
Payer: MEDICARE

## 2021-12-22 DIAGNOSIS — E11.22 TYPE 2 DIABETES MELLITUS WITH STAGE 3B CHRONIC KIDNEY DISEASE, WITHOUT LONG-TERM CURRENT USE OF INSULIN: ICD-10-CM

## 2021-12-22 DIAGNOSIS — N18.32 TYPE 2 DIABETES MELLITUS WITH STAGE 3B CHRONIC KIDNEY DISEASE, WITHOUT LONG-TERM CURRENT USE OF INSULIN: ICD-10-CM

## 2021-12-22 LAB
ALBUMIN SERPL BCP-MCNC: 3.9 G/DL (ref 3.5–5.2)
ALP SERPL-CCNC: 61 U/L (ref 55–135)
ALT SERPL W/O P-5'-P-CCNC: 37 U/L (ref 10–44)
ANION GAP SERPL CALC-SCNC: 12 MMOL/L (ref 8–16)
AST SERPL-CCNC: 25 U/L (ref 10–40)
BILIRUB SERPL-MCNC: 0.7 MG/DL (ref 0.1–1)
BUN SERPL-MCNC: 37 MG/DL (ref 8–23)
CALCIUM SERPL-MCNC: 9.7 MG/DL (ref 8.7–10.5)
CHLORIDE SERPL-SCNC: 103 MMOL/L (ref 95–110)
CO2 SERPL-SCNC: 22 MMOL/L (ref 23–29)
CREAT SERPL-MCNC: 2.8 MG/DL (ref 0.5–1.4)
EST. GFR  (AFRICAN AMERICAN): 25.8 ML/MIN/1.73 M^2
EST. GFR  (NON AFRICAN AMERICAN): 22.3 ML/MIN/1.73 M^2
ESTIMATED AVG GLUCOSE: 200 MG/DL (ref 68–131)
GLUCOSE SERPL-MCNC: 224 MG/DL (ref 70–110)
HBA1C MFR BLD: 8.6 % (ref 4–5.6)
POTASSIUM SERPL-SCNC: 4.6 MMOL/L (ref 3.5–5.1)
PROT SERPL-MCNC: 7.2 G/DL (ref 6–8.4)
SODIUM SERPL-SCNC: 137 MMOL/L (ref 136–145)

## 2021-12-22 PROCEDURE — 83036 HEMOGLOBIN GLYCOSYLATED A1C: CPT | Performed by: FAMILY MEDICINE

## 2021-12-22 PROCEDURE — 80053 COMPREHEN METABOLIC PANEL: CPT | Performed by: FAMILY MEDICINE

## 2021-12-22 PROCEDURE — 36415 COLL VENOUS BLD VENIPUNCTURE: CPT | Mod: PO | Performed by: FAMILY MEDICINE

## 2022-01-24 ENCOUNTER — HISTORICAL (OUTPATIENT)
Dept: LAB | Facility: HOSPITAL | Age: 67
End: 2022-01-24

## 2022-01-24 LAB
BUN SERPL-MCNC: 15.7 MG/DL (ref 8.4–25.7)
CALCIUM SERPL-MCNC: 9.7 MG/DL (ref 8.7–10.5)
CHLORIDE SERPL-SCNC: 104 MMOL/L (ref 98–107)
CO2 SERPL-SCNC: 27 MMOL/L (ref 23–31)
CREAT SERPL-MCNC: 2.38 MG/DL (ref 0.73–1.18)
CREAT/UREA NIT SERPL: 7
GLUCOSE SERPL-MCNC: 232 MG/DL (ref 82–115)
POTASSIUM SERPL-SCNC: 4.5 MMOL/L (ref 3.5–5.1)
SODIUM SERPL-SCNC: 141 MMOL/L (ref 136–145)

## 2022-02-17 ENCOUNTER — PATIENT OUTREACH (OUTPATIENT)
Dept: ADMINISTRATIVE | Facility: HOSPITAL | Age: 67
End: 2022-02-17
Payer: MEDICARE

## 2022-02-17 ENCOUNTER — PATIENT MESSAGE (OUTPATIENT)
Dept: ADMINISTRATIVE | Facility: HOSPITAL | Age: 67
End: 2022-02-17
Payer: MEDICARE

## 2022-02-17 ENCOUNTER — TELEPHONE (OUTPATIENT)
Dept: FAMILY MEDICINE | Facility: CLINIC | Age: 67
End: 2022-02-17
Payer: MEDICARE

## 2022-02-17 DIAGNOSIS — E11.9 TYPE 2 DIABETES MELLITUS WITHOUT COMPLICATION, WITHOUT LONG-TERM CURRENT USE OF INSULIN: Primary | ICD-10-CM

## 2022-02-17 NOTE — TELEPHONE ENCOUNTER
----- Message from Maggiekatia Casimiro sent at 2/17/2022 11:01 AM CST -----  Contact: pt at [phone]  Type: Needs Medical Advice  Who Called:  pt  Best Call Back Number: [phone]  Additional Information: pt is calling the office to have some blood work ordered and scheduled for next month. The pt has orders for blood work ordered for his kidney Dr and would like the orders for his A1C added to that Dr's orders. The pt would like the office to fax the orders over to [phone] which is his kidney Dr's office fax number. Please call back and advise.

## 2022-03-08 ENCOUNTER — HISTORICAL (OUTPATIENT)
Dept: LAB | Facility: HOSPITAL | Age: 67
End: 2022-03-08

## 2022-03-08 DIAGNOSIS — M10.9 GOUT, UNSPECIFIED CAUSE, UNSPECIFIED CHRONICITY, UNSPECIFIED SITE: ICD-10-CM

## 2022-03-08 LAB
ABS NEUT (OLG): 4.95 (ref 2.1–9.2)
ALBUMIN SERPL-MCNC: 3.8 G/DL (ref 3.4–4.8)
ALBUMIN/GLOB SERPL: 1.2 {RATIO} (ref 1.1–2)
ALP SERPL-CCNC: 63 U/L (ref 40–150)
ALT SERPL-CCNC: 39 U/L (ref 0–55)
AST SERPL-CCNC: 27 U/L (ref 5–34)
BASOPHILS # BLD AUTO: 0 10*3/UL (ref 0–0.2)
BASOPHILS NFR BLD AUTO: 0 %
BILIRUB SERPL-MCNC: 0.7 MG/DL
BILIRUBIN DIRECT+TOT PNL SERPL-MCNC: 0.3 (ref 0–0.5)
BILIRUBIN DIRECT+TOT PNL SERPL-MCNC: 0.4 (ref 0–0.8)
BUN SERPL-MCNC: 23.3 MG/DL (ref 8.4–25.7)
CALCIUM SERPL-MCNC: 9.5 MG/DL (ref 8.7–10.5)
CHLORIDE SERPL-SCNC: 105 MMOL/L (ref 98–107)
CO2 SERPL-SCNC: 22 MMOL/L (ref 23–31)
CREAT SERPL-MCNC: 2.34 MG/DL (ref 0.73–1.18)
CREAT UR-MCNC: 100.8 MG/DL (ref 58–161)
EOSINOPHIL # BLD AUTO: 0.2 10*3/UL (ref 0–0.9)
EOSINOPHIL NFR BLD AUTO: 2 %
ERYTHROCYTE [DISTWIDTH] IN BLOOD BY AUTOMATED COUNT: 13.5 % (ref 11.5–17)
EST. AVERAGE GLUCOSE BLD GHB EST-MCNC: 188.6 MG/DL
GLOBULIN SER-MCNC: 3.1 G/DL (ref 2.4–3.5)
GLUCOSE SERPL-MCNC: 183 MG/DL (ref 82–115)
HBA1C MFR BLD: 8.2 %
HCT VFR BLD AUTO: 46 % (ref 42–52)
HEMOLYSIS INTERF INDEX SERPL-ACNC: 1
HGB BLD-MCNC: 15.3 G/DL (ref 14–18)
ICTERIC INTERF INDEX SERPL-ACNC: 1
IMM GRANULOCYTES # BLD AUTO: 0.03 10*3/UL (ref 0–0.02)
IMM GRANULOCYTES NFR BLD AUTO: 0.4 % (ref 0–0.43)
LIPEMIC INTERF INDEX SERPL-ACNC: 7
LYMPHOCYTES # BLD AUTO: 1.7 10*3/UL (ref 0.6–4.6)
LYMPHOCYTES NFR BLD AUTO: 22 %
MANUAL DIFF? (OHS): NO
MCH RBC QN AUTO: 31 PG (ref 27–31)
MCHC RBC AUTO-ENTMCNC: 33.3 G/DL (ref 33–36)
MCV RBC AUTO: 93.1 FL (ref 80–94)
MONOCYTES # BLD AUTO: 0.7 10*3/UL (ref 0.1–1.3)
MONOCYTES NFR BLD AUTO: 10 %
NEUTROPHILS # BLD AUTO: 4.95 10*3/UL (ref 1.4–7.9)
NEUTROPHILS NFR BLD AUTO: 65 %
PLATELET # BLD AUTO: 230 10*3/UL (ref 130–400)
PMV BLD AUTO: 10.6 FL (ref 9.4–12.4)
POTASSIUM SERPL-SCNC: 3.9 MMOL/L (ref 3.5–5.1)
PROT SERPL-MCNC: 6.9 G/DL (ref 5.8–7.6)
PROT UR STRIP-MCNC: 11.7 MG/DL
RBC # BLD AUTO: 4.94 10*6/UL (ref 4.7–6.1)
SODIUM SERPL-SCNC: 138 MMOL/L (ref 136–145)
WBC # SPEC AUTO: 7.6 10*3/UL (ref 4.5–11.5)

## 2022-03-08 RX ORDER — ALLOPURINOL 300 MG/1
300 TABLET ORAL DAILY
Qty: 90 TABLET | Refills: 3 | Status: SHIPPED | OUTPATIENT
Start: 2022-03-08

## 2022-03-08 NOTE — TELEPHONE ENCOUNTER
----- Message from Celi Wood sent at 3/8/2022  9:14 AM CST -----  Type:  RX Refill Request    Who Called:  pt  Refill or New Rx:  refill  RX Name and Strength:  allopurinoL (ZYLOPRIM) 300 MG tablet    Take 1 tablet (300 mg total) by mouth once daily      Is this a 30 day or 90 day RX:  90  Preferred Pharmacy with phone number:    [facility] - [address] [address]  Phone: [phone] Fax: [phone]      Best Call Back Number:  [phone] (home)     Additional Information:  Please advise ty

## 2022-03-08 NOTE — TELEPHONE ENCOUNTER
Care Due:                  Date            Visit Type   Department     Provider  --------------------------------------------------------------------------------                                ESTABLISHED                              PATIENT -    [facility] FAMILY  Last Visit: 12-      [facility]      MEDICINE       Darien Trejo  Next Visit: None Scheduled  None         None Found                                                            Last  Test          Frequency    Reason                     Performed    Due Date  --------------------------------------------------------------------------------    CBC.........  12 months..  allopurinoL..............  Not Found    Overdue    Uric Acid...  12 months..  allopurinoL..............  Not Found    Overdue    Powered by [employer]. Reference number: [id number].   3/08/2022 10:43:43 AM CST

## 2022-05-09 ENCOUNTER — PATIENT OUTREACH (OUTPATIENT)
Dept: ADMINISTRATIVE | Facility: HOSPITAL | Age: 67
End: 2022-05-09
Payer: MEDICARE

## 2022-05-09 NOTE — PROGRESS NOTES
Non-compliant report chart audits HGBA1C.        Care Everywhere and media, updates requested and reviewed.      [facility] and [facility] reviewed for tests needed.    Outreach to patient    LMOR to return call to clinic      Outreach:  HGBA1C

## 2022-06-14 ENCOUNTER — LAB VISIT (OUTPATIENT)
Dept: LAB | Facility: HOSPITAL | Age: 67
End: 2022-06-14
Payer: MEDICARE

## 2022-06-14 DIAGNOSIS — N18.32 CHRONIC KIDNEY DISEASE (CKD) STAGE G3B/A1, MODERATELY DECREASED GLOMERULAR FILTRATION RATE (GFR) BETWEEN 30-44 ML/MIN/1.73 SQUARE METER AND ALBUMINURIA CREATININE RATIO LESS THAN 30 MG/G: Primary | ICD-10-CM

## 2022-06-14 LAB
ALBUMIN SERPL-MCNC: 3.7 GM/DL (ref 3.4–4.8)
ALBUMIN/GLOB SERPL: 1.2 RATIO (ref 1.1–2)
ALP SERPL-CCNC: 66 UNIT/L (ref 40–150)
ALT SERPL-CCNC: 34 UNIT/L (ref 0–55)
AST SERPL-CCNC: 23 UNIT/L (ref 5–34)
BASOPHILS # BLD AUTO: 0.07 X10(3)/MCL (ref 0–0.2)
BASOPHILS NFR BLD AUTO: 0.9 %
BILIRUBIN DIRECT+TOT PNL SERPL-MCNC: 0.7 MG/DL
BUN SERPL-MCNC: 23.9 MG/DL (ref 8.4–25.7)
CALCIUM SERPL-MCNC: 9.5 MG/DL (ref 8.8–10)
CHLORIDE SERPL-SCNC: 109 MMOL/L (ref 98–107)
CO2 SERPL-SCNC: 25 MMOL/L (ref 23–31)
CREAT SERPL-MCNC: 2.2 MG/DL (ref 0.73–1.18)
DEPRECATED CALCIDIOL+CALCIFEROL SERPL-MC: 20.5 NG/ML (ref 30–80)
EOSINOPHIL # BLD AUTO: 0.28 X10(3)/MCL (ref 0–0.9)
EOSINOPHIL NFR BLD AUTO: 3.6 %
ERYTHROCYTE [DISTWIDTH] IN BLOOD BY AUTOMATED COUNT: 13 % (ref 11.5–17)
GLOBULIN SER-MCNC: 3 GM/DL (ref 2.4–3.5)
GLUCOSE SERPL-MCNC: 192 MG/DL (ref 82–115)
HCT VFR BLD AUTO: 42.4 % (ref 42–52)
HGB BLD-MCNC: 14.3 GM/DL (ref 14–18)
IMM GRANULOCYTES # BLD AUTO: 0.03 X10(3)/MCL (ref 0–0.02)
IMM GRANULOCYTES NFR BLD AUTO: 0.4 % (ref 0–0.43)
LYMPHOCYTES # BLD AUTO: 1.48 X10(3)/MCL (ref 0.6–4.6)
LYMPHOCYTES NFR BLD AUTO: 19.2 %
MAGNESIUM SERPL-MCNC: 2.1 MG/DL (ref 1.6–2.6)
MCH RBC QN AUTO: 31.5 PG (ref 27–31)
MCHC RBC AUTO-ENTMCNC: 33.7 MG/DL (ref 33–36)
MCV RBC AUTO: 93.4 FL (ref 80–94)
MONOCYTES # BLD AUTO: 0.66 X10(3)/MCL (ref 0.1–1.3)
MONOCYTES NFR BLD AUTO: 8.6 %
NEUTROPHILS # BLD AUTO: 5.2 X10(3)/MCL (ref 2.1–9.2)
NEUTROPHILS NFR BLD AUTO: 67.3 %
PHOSPHATE SERPL-MCNC: 3.1 MG/DL (ref 2.3–4.7)
PLATELET # BLD AUTO: 241 X10(3)/MCL (ref 130–400)
PMV BLD AUTO: 10.5 FL (ref 9.4–12.4)
POTASSIUM SERPL-SCNC: 4.1 MMOL/L (ref 3.5–5.1)
PROT SERPL-MCNC: 6.7 GM/DL (ref 5.8–7.6)
PTH-INTACT SERPL-MCNC: 189.8 PG/ML (ref 8.7–77)
RBC # BLD AUTO: 4.54 X10(6)/MCL (ref 4.7–6.1)
SODIUM SERPL-SCNC: 143 MMOL/L (ref 136–145)
WBC # SPEC AUTO: 7.7 X10(3)/MCL (ref 4.5–11.5)

## 2022-06-14 PROCEDURE — 82306 VITAMIN D 25 HYDROXY: CPT

## 2022-06-14 PROCEDURE — 85025 COMPLETE CBC W/AUTO DIFF WBC: CPT

## 2022-06-14 PROCEDURE — 84100 ASSAY OF PHOSPHORUS: CPT

## 2022-06-14 PROCEDURE — 83735 ASSAY OF MAGNESIUM: CPT

## 2022-06-14 PROCEDURE — 80053 COMPREHEN METABOLIC PANEL: CPT

## 2022-06-14 PROCEDURE — 36415 COLL VENOUS BLD VENIPUNCTURE: CPT

## 2022-06-14 PROCEDURE — 83970 ASSAY OF PARATHORMONE: CPT

## 2022-06-16 ENCOUNTER — LAB VISIT (OUTPATIENT)
Dept: LAB | Facility: HOSPITAL | Age: 67
End: 2022-06-16
Attending: NURSE PRACTITIONER
Payer: MEDICARE

## 2022-06-16 DIAGNOSIS — R80.9 PROTEINURIA, UNSPECIFIED TYPE: Primary | ICD-10-CM

## 2022-06-16 LAB
PROT 24H UR-MCNC: <153 MG/24 H (ref 0–165)
PROT UR STRIP-MCNC: <6.8 MG/DL
TOTAL VOLUME  (OHS): 2250 ML

## 2022-06-16 PROCEDURE — 82042 OTHER SOURCE ALBUMIN QUAN EA: CPT

## 2022-07-07 ENCOUNTER — PATIENT OUTREACH (OUTPATIENT)
Dept: ADMINISTRATIVE | Facility: HOSPITAL | Age: 67
End: 2022-07-07
Payer: MEDICARE

## 2022-07-07 NOTE — PROGRESS NOTES
Pt outreach to establish with new PCP, as Dr Trejo has retired    Pt has established with new PCP in [place]

## 2022-10-03 ENCOUNTER — LAB VISIT (OUTPATIENT)
Dept: LAB | Facility: HOSPITAL | Age: 67
End: 2022-10-03
Attending: INTERNAL MEDICINE
Payer: MEDICARE

## 2022-10-03 DIAGNOSIS — E21.1 SECONDARY HYPERPARATHYROIDISM, NON-RENAL: ICD-10-CM

## 2022-10-03 DIAGNOSIS — N18.32 CHRONIC KIDNEY DISEASE (CKD) STAGE G3B/A1, MODERATELY DECREASED GLOMERULAR FILTRATION RATE (GFR) BETWEEN 30-44 ML/MIN/1.73 SQUARE METER AND ALBUMINURIA CREATININE RATIO LESS THAN 30 MG/G: Primary | ICD-10-CM

## 2022-10-03 DIAGNOSIS — E55.9 VITAMIN D DEFICIENCY DISEASE: ICD-10-CM

## 2022-10-03 DIAGNOSIS — R80.8 NEPHROGENOUS PROTEINURIA: ICD-10-CM

## 2022-10-03 DIAGNOSIS — N18.6 TYPE 2 DIABETES MELLITUS WITH END-STAGE RENAL DISEASE: ICD-10-CM

## 2022-10-03 DIAGNOSIS — E11.22 TYPE 2 DIABETES MELLITUS WITH END-STAGE RENAL DISEASE: ICD-10-CM

## 2022-10-03 DIAGNOSIS — E87.5 HYPERPOTASSEMIA: ICD-10-CM

## 2022-10-03 DIAGNOSIS — E83.51 HYPOCALCEMIA: ICD-10-CM

## 2022-10-03 DIAGNOSIS — I12.9 PARENCHYMAL RENAL HYPERTENSION: ICD-10-CM

## 2022-10-03 LAB
ALBUMIN SERPL-MCNC: 3.6 GM/DL (ref 3.4–4.8)
ALBUMIN/GLOB SERPL: 1.3 RATIO (ref 1.1–2)
ALP SERPL-CCNC: 91 UNIT/L (ref 40–150)
ALT SERPL-CCNC: 31 UNIT/L (ref 0–55)
AST SERPL-CCNC: 18 UNIT/L (ref 5–34)
BASOPHILS # BLD AUTO: 0.03 X10(3)/MCL (ref 0–0.2)
BASOPHILS NFR BLD AUTO: 0.4 %
BILIRUBIN DIRECT+TOT PNL SERPL-MCNC: 0.9 MG/DL
BUN SERPL-MCNC: 25.4 MG/DL (ref 8.4–25.7)
CALCIUM SERPL-MCNC: 9.2 MG/DL (ref 8.8–10)
CHLORIDE SERPL-SCNC: 110 MMOL/L (ref 98–107)
CO2 SERPL-SCNC: 24 MMOL/L (ref 23–31)
CREAT SERPL-MCNC: 1.92 MG/DL (ref 0.73–1.18)
DEPRECATED CALCIDIOL+CALCIFEROL SERPL-MC: 43.5 NG/ML (ref 30–80)
EOSINOPHIL # BLD AUTO: 0.24 X10(3)/MCL (ref 0–0.9)
EOSINOPHIL NFR BLD AUTO: 2.9 %
ERYTHROCYTE [DISTWIDTH] IN BLOOD BY AUTOMATED COUNT: 13 % (ref 11.5–17)
GFR SERPLBLD CREATININE-BSD FMLA CKD-EPI: 37 MLS/MIN/1.73/M2
GLOBULIN SER-MCNC: 2.8 GM/DL (ref 2.4–3.5)
GLUCOSE SERPL-MCNC: 168 MG/DL (ref 82–115)
HCT VFR BLD AUTO: 45.3 % (ref 42–52)
HGB BLD-MCNC: 15 GM/DL (ref 14–18)
IMM GRANULOCYTES # BLD AUTO: 0.01 X10(3)/MCL (ref 0–0.04)
IMM GRANULOCYTES NFR BLD AUTO: 0.1 %
LYMPHOCYTES # BLD AUTO: 1.46 X10(3)/MCL (ref 0.6–4.6)
LYMPHOCYTES NFR BLD AUTO: 17.5 %
MAGNESIUM SERPL-MCNC: 2 MG/DL (ref 1.6–2.6)
MCH RBC QN AUTO: 31.3 PG (ref 27–31)
MCHC RBC AUTO-ENTMCNC: 33.1 MG/DL (ref 33–36)
MCV RBC AUTO: 94.4 FL (ref 80–94)
MONOCYTES # BLD AUTO: 0.63 X10(3)/MCL (ref 0.1–1.3)
MONOCYTES NFR BLD AUTO: 7.5 %
NEUTROPHILS # BLD AUTO: 6 X10(3)/MCL (ref 2.1–9.2)
NEUTROPHILS NFR BLD AUTO: 71.6 %
PHOSPHATE SERPL-MCNC: 3.5 MG/DL (ref 2.3–4.7)
PLATELET # BLD AUTO: 196 X10(3)/MCL (ref 130–400)
PMV BLD AUTO: 10.7 FL (ref 7.4–10.4)
POTASSIUM SERPL-SCNC: 5 MMOL/L (ref 3.5–5.1)
PROT SERPL-MCNC: 6.4 GM/DL (ref 5.8–7.6)
PTH-INTACT SERPL-MCNC: 156.2 PG/ML (ref 8.7–77)
RBC # BLD AUTO: 4.8 X10(6)/MCL (ref 4.7–6.1)
SODIUM SERPL-SCNC: 144 MMOL/L (ref 136–145)
WBC # SPEC AUTO: 8.4 X10(3)/MCL (ref 4.5–11.5)

## 2022-10-03 PROCEDURE — 84100 ASSAY OF PHOSPHORUS: CPT

## 2022-10-03 PROCEDURE — 85025 COMPLETE CBC W/AUTO DIFF WBC: CPT

## 2022-10-03 PROCEDURE — 83735 ASSAY OF MAGNESIUM: CPT

## 2022-10-03 PROCEDURE — 82306 VITAMIN D 25 HYDROXY: CPT

## 2022-10-03 PROCEDURE — 36415 COLL VENOUS BLD VENIPUNCTURE: CPT

## 2022-10-03 PROCEDURE — 80053 COMPREHEN METABOLIC PANEL: CPT

## 2022-10-03 PROCEDURE — 83970 ASSAY OF PARATHORMONE: CPT

## 2022-10-27 ENCOUNTER — LAB VISIT (OUTPATIENT)
Dept: LAB | Facility: HOSPITAL | Age: 67
End: 2022-10-27
Attending: INTERNAL MEDICINE
Payer: MEDICARE

## 2022-10-27 DIAGNOSIS — N18.32 CHRONIC KIDNEY DISEASE (CKD) STAGE G3B/A1, MODERATELY DECREASED GLOMERULAR FILTRATION RATE (GFR) BETWEEN 30-44 ML/MIN/1.73 SQUARE METER AND ALBUMINURIA CREATININE RATIO LESS THAN 30 MG/G: Primary | ICD-10-CM

## 2022-10-27 DIAGNOSIS — E21.1 SECONDARY HYPERPARATHYROIDISM, NON-RENAL: ICD-10-CM

## 2022-10-27 DIAGNOSIS — E83.51 HYPOCALCEMIA: ICD-10-CM

## 2022-10-27 DIAGNOSIS — I12.9 PARENCHYMAL RENAL HYPERTENSION: ICD-10-CM

## 2022-10-27 DIAGNOSIS — E87.5 HYPERKALEMIA: ICD-10-CM

## 2022-10-27 DIAGNOSIS — E11.22 DIABETES MELLITUS WITH STAGE 1 CHRONIC KIDNEY DISEASE: ICD-10-CM

## 2022-10-27 DIAGNOSIS — R80.9 PROTEINURIA, UNSPECIFIED TYPE: ICD-10-CM

## 2022-10-27 DIAGNOSIS — E78.2 MIXED HYPERLIPIDEMIA: ICD-10-CM

## 2022-10-27 DIAGNOSIS — N18.1 DIABETES MELLITUS WITH STAGE 1 CHRONIC KIDNEY DISEASE: ICD-10-CM

## 2022-10-27 DIAGNOSIS — E55.9 VITAMIN D DEFICIENCY DISEASE: ICD-10-CM

## 2022-10-27 LAB
ANION GAP SERPL CALC-SCNC: 10 MEQ/L
BUN SERPL-MCNC: 27.9 MG/DL (ref 8.4–25.7)
CALCIUM SERPL-MCNC: 9.4 MG/DL (ref 8.8–10)
CHLORIDE SERPL-SCNC: 108 MMOL/L (ref 98–107)
CHOLEST SERPL-MCNC: 128 MG/DL
CHOLEST/HDLC SERPL: 5 {RATIO} (ref 0–5)
CO2 SERPL-SCNC: 25 MMOL/L (ref 23–31)
CREAT SERPL-MCNC: 2.09 MG/DL (ref 0.73–1.18)
CREAT/UREA NIT SERPL: 13
GFR SERPLBLD CREATININE-BSD FMLA CKD-EPI: 34 MLS/MIN/1.73/M2
GLUCOSE SERPL-MCNC: 154 MG/DL (ref 82–115)
HDLC SERPL-MCNC: 26 MG/DL (ref 35–60)
LDLC SERPL CALC-MCNC: 65 MG/DL (ref 50–140)
POTASSIUM SERPL-SCNC: 4.7 MMOL/L (ref 3.5–5.1)
SODIUM SERPL-SCNC: 143 MMOL/L (ref 136–145)
TRIGL SERPL-MCNC: 184 MG/DL (ref 34–140)
VLDLC SERPL CALC-MCNC: 37 MG/DL

## 2022-10-27 PROCEDURE — 80048 BASIC METABOLIC PNL TOTAL CA: CPT

## 2022-10-27 PROCEDURE — 36415 COLL VENOUS BLD VENIPUNCTURE: CPT

## 2022-10-27 PROCEDURE — 80061 LIPID PANEL: CPT

## 2022-11-02 ENCOUNTER — LAB VISIT (OUTPATIENT)
Dept: LAB | Facility: HOSPITAL | Age: 67
End: 2022-11-02
Attending: INTERNAL MEDICINE
Payer: MEDICARE

## 2022-11-02 DIAGNOSIS — N18.6 TYPE 2 DIABETES MELLITUS WITH END-STAGE RENAL DISEASE: ICD-10-CM

## 2022-11-02 DIAGNOSIS — E21.1 SECONDARY HYPERPARATHYROIDISM, NON-RENAL: ICD-10-CM

## 2022-11-02 DIAGNOSIS — N18.32 CHRONIC KIDNEY DISEASE (CKD) STAGE G3B/A1, MODERATELY DECREASED GLOMERULAR FILTRATION RATE (GFR) BETWEEN 30-44 ML/MIN/1.73 SQUARE METER AND ALBUMINURIA CREATININE RATIO LESS THAN 30 MG/G: Primary | ICD-10-CM

## 2022-11-02 DIAGNOSIS — E11.22 TYPE 2 DIABETES MELLITUS WITH END-STAGE RENAL DISEASE: ICD-10-CM

## 2022-11-02 DIAGNOSIS — E55.9 AVITAMINOSIS D: ICD-10-CM

## 2022-11-02 DIAGNOSIS — E83.51 HYPOCALCEMIA: ICD-10-CM

## 2022-11-02 DIAGNOSIS — E87.5 HYPERPOTASSEMIA: ICD-10-CM

## 2022-11-02 DIAGNOSIS — I12.9 PARENCHYMAL RENAL HYPERTENSION: ICD-10-CM

## 2022-11-02 LAB
ALBUMIN SERPL-MCNC: 3.3 GM/DL (ref 3.4–4.8)
ALBUMIN/GLOB SERPL: 1.1 RATIO (ref 1.1–2)
ALP SERPL-CCNC: 80 UNIT/L (ref 40–150)
ALT SERPL-CCNC: 21 UNIT/L (ref 0–55)
AST SERPL-CCNC: 16 UNIT/L (ref 5–34)
BASOPHILS # BLD AUTO: 0.04 X10(3)/MCL (ref 0–0.2)
BASOPHILS NFR BLD AUTO: 0.5 %
BILIRUBIN DIRECT+TOT PNL SERPL-MCNC: 1 MG/DL
BUN SERPL-MCNC: 20.3 MG/DL (ref 8.4–25.7)
CALCIUM SERPL-MCNC: 9 MG/DL (ref 8.8–10)
CHLORIDE SERPL-SCNC: 111 MMOL/L (ref 98–107)
CO2 SERPL-SCNC: 25 MMOL/L (ref 23–31)
CREAT SERPL-MCNC: 1.92 MG/DL (ref 0.73–1.18)
DEPRECATED CALCIDIOL+CALCIFEROL SERPL-MC: 25.2 NG/ML (ref 30–80)
EOSINOPHIL # BLD AUTO: 0.21 X10(3)/MCL (ref 0–0.9)
EOSINOPHIL NFR BLD AUTO: 2.7 %
ERYTHROCYTE [DISTWIDTH] IN BLOOD BY AUTOMATED COUNT: 13.3 % (ref 11.5–17)
GFR SERPLBLD CREATININE-BSD FMLA CKD-EPI: 37 MLS/MIN/1.73/M2
GLOBULIN SER-MCNC: 2.9 GM/DL (ref 2.4–3.5)
GLUCOSE SERPL-MCNC: 144 MG/DL (ref 82–115)
HCT VFR BLD AUTO: 43 % (ref 42–52)
HGB BLD-MCNC: 14 GM/DL (ref 14–18)
IMM GRANULOCYTES # BLD AUTO: 0.01 X10(3)/MCL (ref 0–0.04)
IMM GRANULOCYTES NFR BLD AUTO: 0.1 %
LYMPHOCYTES # BLD AUTO: 1.55 X10(3)/MCL (ref 0.6–4.6)
LYMPHOCYTES NFR BLD AUTO: 19.6 %
MAGNESIUM SERPL-MCNC: 1.9 MG/DL (ref 1.6–2.6)
MCH RBC QN AUTO: 30.4 PG (ref 27–31)
MCHC RBC AUTO-ENTMCNC: 32.6 MG/DL (ref 33–36)
MCV RBC AUTO: 93.5 FL (ref 80–94)
MONOCYTES # BLD AUTO: 0.65 X10(3)/MCL (ref 0.1–1.3)
MONOCYTES NFR BLD AUTO: 8.2 %
NEUTROPHILS # BLD AUTO: 5.4 X10(3)/MCL (ref 2.1–9.2)
NEUTROPHILS NFR BLD AUTO: 68.9 %
PHOSPHATE SERPL-MCNC: 3.7 MG/DL (ref 2.3–4.7)
PLATELET # BLD AUTO: 189 X10(3)/MCL (ref 130–400)
PMV BLD AUTO: 10.6 FL (ref 7.4–10.4)
POTASSIUM SERPL-SCNC: 4.4 MMOL/L (ref 3.5–5.1)
PROT SERPL-MCNC: 6.2 GM/DL (ref 5.8–7.6)
PTH-INTACT SERPL-MCNC: 169.3 PG/ML (ref 8.7–77)
RBC # BLD AUTO: 4.6 X10(6)/MCL (ref 4.7–6.1)
SODIUM SERPL-SCNC: 145 MMOL/L (ref 136–145)
WBC # SPEC AUTO: 7.9 X10(3)/MCL (ref 4.5–11.5)

## 2022-11-02 PROCEDURE — 83970 ASSAY OF PARATHORMONE: CPT

## 2022-11-02 PROCEDURE — 82306 VITAMIN D 25 HYDROXY: CPT

## 2022-11-02 PROCEDURE — 80053 COMPREHEN METABOLIC PANEL: CPT

## 2022-11-02 PROCEDURE — 85025 COMPLETE CBC W/AUTO DIFF WBC: CPT

## 2022-11-02 PROCEDURE — 83735 ASSAY OF MAGNESIUM: CPT

## 2022-11-02 PROCEDURE — 84100 ASSAY OF PHOSPHORUS: CPT

## 2022-11-02 PROCEDURE — 36415 COLL VENOUS BLD VENIPUNCTURE: CPT

## 2022-11-16 ENCOUNTER — LAB VISIT (OUTPATIENT)
Dept: LAB | Facility: HOSPITAL | Age: 67
End: 2022-11-16
Attending: INTERNAL MEDICINE
Payer: MEDICARE

## 2022-11-16 DIAGNOSIS — E11.22 TYPE 2 DIABETES MELLITUS WITH END-STAGE RENAL DISEASE: ICD-10-CM

## 2022-11-16 DIAGNOSIS — N18.6 TYPE 2 DIABETES MELLITUS WITH END-STAGE RENAL DISEASE: ICD-10-CM

## 2022-11-16 DIAGNOSIS — E25.0 3 BETA-HSD DEFICIENCY: ICD-10-CM

## 2022-11-16 DIAGNOSIS — E55.9 AVITAMINOSIS D: ICD-10-CM

## 2022-11-16 DIAGNOSIS — N18.32 CHRONIC KIDNEY DISEASE (CKD) STAGE G3B/A1, MODERATELY DECREASED GLOMERULAR FILTRATION RATE (GFR) BETWEEN 30-44 ML/MIN/1.73 SQUARE METER AND ALBUMINURIA CREATININE RATIO LESS THAN 30 MG/G: Primary | ICD-10-CM

## 2022-11-16 LAB
ANION GAP SERPL CALC-SCNC: 11 MEQ/L
BUN SERPL-MCNC: 41.5 MG/DL (ref 8.4–25.7)
CALCIUM SERPL-MCNC: 9.9 MG/DL (ref 8.8–10)
CHLORIDE SERPL-SCNC: 108 MMOL/L (ref 98–107)
CO2 SERPL-SCNC: 25 MMOL/L (ref 23–31)
CREAT SERPL-MCNC: 2.24 MG/DL (ref 0.73–1.18)
CREAT/UREA NIT SERPL: 19
GFR SERPLBLD CREATININE-BSD FMLA CKD-EPI: 31 MLS/MIN/1.73/M2
GLUCOSE SERPL-MCNC: 157 MG/DL (ref 82–115)
POTASSIUM SERPL-SCNC: 5 MMOL/L (ref 3.5–5.1)
SODIUM SERPL-SCNC: 144 MMOL/L (ref 136–145)

## 2022-11-16 PROCEDURE — 36415 COLL VENOUS BLD VENIPUNCTURE: CPT

## 2022-11-16 PROCEDURE — 80048 BASIC METABOLIC PNL TOTAL CA: CPT

## 2023-01-04 ENCOUNTER — LAB VISIT (OUTPATIENT)
Dept: LAB | Facility: HOSPITAL | Age: 68
End: 2023-01-04
Attending: INTERNAL MEDICINE
Payer: MEDICARE

## 2023-01-04 DIAGNOSIS — E78.5 HYPERLIPIDEMIA, UNSPECIFIED HYPERLIPIDEMIA TYPE: ICD-10-CM

## 2023-01-04 DIAGNOSIS — R80.9 PROTEINURIA, UNSPECIFIED TYPE: ICD-10-CM

## 2023-01-04 DIAGNOSIS — E21.1 SECONDARY HYPERPARATHYROIDISM, NON-RENAL: ICD-10-CM

## 2023-01-04 DIAGNOSIS — E11.22 TYPE 2 DIABETES MELLITUS WITH END-STAGE RENAL DISEASE: ICD-10-CM

## 2023-01-04 DIAGNOSIS — I12.9 PARENCHYMAL RENAL HYPERTENSION: ICD-10-CM

## 2023-01-04 DIAGNOSIS — E55.9 VITAMIN D DEFICIENCY DISEASE: ICD-10-CM

## 2023-01-04 DIAGNOSIS — N18.32 CHRONIC KIDNEY DISEASE (CKD) STAGE G3B/A1, MODERATELY DECREASED GLOMERULAR FILTRATION RATE (GFR) BETWEEN 30-44 ML/MIN/1.73 SQUARE METER AND ALBUMINURIA CREATININE RATIO LESS THAN 30 MG/G: Primary | ICD-10-CM

## 2023-01-04 DIAGNOSIS — N18.6 TYPE 2 DIABETES MELLITUS WITH END-STAGE RENAL DISEASE: ICD-10-CM

## 2023-01-04 DIAGNOSIS — E83.51 HYPOCALCEMIA: ICD-10-CM

## 2023-01-04 LAB
ALBUMIN SERPL-MCNC: 3.6 G/DL (ref 3.4–4.8)
ALBUMIN/GLOB SERPL: 1 RATIO (ref 1.1–2)
ALP SERPL-CCNC: 100 UNIT/L (ref 40–150)
ALT SERPL-CCNC: 25 UNIT/L (ref 0–55)
AST SERPL-CCNC: 16 UNIT/L (ref 5–34)
BASOPHILS # BLD AUTO: 0.03 X10(3)/MCL (ref 0–0.2)
BASOPHILS NFR BLD AUTO: 0.3 %
BILIRUBIN DIRECT+TOT PNL SERPL-MCNC: 1.1 MG/DL
BUN SERPL-MCNC: 24.4 MG/DL (ref 8.4–25.7)
CALCIUM SERPL-MCNC: 10 MG/DL (ref 8.8–10)
CHLORIDE SERPL-SCNC: 110 MMOL/L (ref 98–107)
CO2 SERPL-SCNC: 25 MMOL/L (ref 23–31)
CREAT SERPL-MCNC: 1.85 MG/DL (ref 0.73–1.18)
EOSINOPHIL # BLD AUTO: 0.28 X10(3)/MCL (ref 0–0.9)
EOSINOPHIL NFR BLD AUTO: 2.9 %
ERYTHROCYTE [DISTWIDTH] IN BLOOD BY AUTOMATED COUNT: 13.9 % (ref 11.6–14.4)
GFR SERPLBLD CREATININE-BSD FMLA CKD-EPI: 39 MLS/MIN/1.73/M2
GLOBULIN SER-MCNC: 3.5 GM/DL (ref 2.4–3.5)
GLUCOSE SERPL-MCNC: 147 MG/DL (ref 82–115)
HCT VFR BLD AUTO: 47.3 % (ref 42–52)
HGB BLD-MCNC: 14.9 GM/DL (ref 14–18)
IMM GRANULOCYTES # BLD AUTO: 0.03 X10(3)/MCL (ref 0–0.04)
IMM GRANULOCYTES NFR BLD AUTO: 0.3 %
LYMPHOCYTES # BLD AUTO: 1.85 X10(3)/MCL (ref 0.6–4.6)
LYMPHOCYTES NFR BLD AUTO: 19.4 %
MAGNESIUM SERPL-MCNC: 2.1 MG/DL (ref 1.6–2.6)
MCH RBC QN AUTO: 30.3 PG
MCHC RBC AUTO-ENTMCNC: 31.5 MG/DL (ref 33–36)
MCV RBC AUTO: 96.3 FL (ref 80–94)
MONOCYTES # BLD AUTO: 0.63 X10(3)/MCL (ref 0.1–1.3)
MONOCYTES NFR BLD AUTO: 6.6 %
NEUTROPHILS # BLD AUTO: 6.73 X10(3)/MCL (ref 2.1–9.2)
NEUTROPHILS NFR BLD AUTO: 70.5 %
PHOSPHATE SERPL-MCNC: 3.2 MG/DL (ref 2.3–4.7)
PLATELET # BLD AUTO: 226 X10(3)/MCL (ref 140–371)
PMV BLD AUTO: 10.4 FL (ref 9.4–12.4)
POTASSIUM SERPL-SCNC: 5 MMOL/L (ref 3.5–5.1)
PROT SERPL-MCNC: 7.1 GM/DL (ref 5.8–7.6)
PTH-INTACT SERPL-MCNC: 157.6 PG/ML (ref 8.7–77)
RBC # BLD AUTO: 4.91 X10(6)/MCL (ref 4.7–6.1)
SODIUM SERPL-SCNC: 143 MMOL/L (ref 136–145)
WBC # SPEC AUTO: 9.6 X10(3)/MCL (ref 4.5–11.5)

## 2023-01-04 PROCEDURE — 80053 COMPREHEN METABOLIC PANEL: CPT

## 2023-01-04 PROCEDURE — 83735 ASSAY OF MAGNESIUM: CPT

## 2023-01-04 PROCEDURE — 83970 ASSAY OF PARATHORMONE: CPT

## 2023-01-04 PROCEDURE — 84100 ASSAY OF PHOSPHORUS: CPT

## 2023-01-04 PROCEDURE — 85025 COMPLETE CBC W/AUTO DIFF WBC: CPT

## 2023-01-04 PROCEDURE — 36415 COLL VENOUS BLD VENIPUNCTURE: CPT

## 2023-01-18 ENCOUNTER — LAB VISIT (OUTPATIENT)
Dept: LAB | Facility: HOSPITAL | Age: 68
End: 2023-01-18
Attending: INTERNAL MEDICINE
Payer: MEDICARE

## 2023-01-18 DIAGNOSIS — N18.32 CHRONIC KIDNEY DISEASE (CKD) STAGE G3B/A1, MODERATELY DECREASED GLOMERULAR FILTRATION RATE (GFR) BETWEEN 30-44 ML/MIN/1.73 SQUARE METER AND ALBUMINURIA CREATININE RATIO LESS THAN 30 MG/G: ICD-10-CM

## 2023-01-18 DIAGNOSIS — E11.22 TYPE 2 DIABETES MELLITUS WITH END-STAGE RENAL DISEASE: ICD-10-CM

## 2023-01-18 DIAGNOSIS — N18.6 TYPE 2 DIABETES MELLITUS WITH END-STAGE RENAL DISEASE: ICD-10-CM

## 2023-01-18 DIAGNOSIS — E55.9 AVITAMINOSIS D: Primary | ICD-10-CM

## 2023-01-18 LAB
ANION GAP SERPL CALC-SCNC: 6 MEQ/L
BUN SERPL-MCNC: 23.6 MG/DL (ref 8.4–25.7)
CALCIUM SERPL-MCNC: 9.6 MG/DL (ref 8.8–10)
CHLORIDE SERPL-SCNC: 108 MMOL/L (ref 98–107)
CO2 SERPL-SCNC: 29 MMOL/L (ref 23–31)
CREAT SERPL-MCNC: 1.84 MG/DL (ref 0.73–1.18)
CREAT/UREA NIT SERPL: 13
GFR SERPLBLD CREATININE-BSD FMLA CKD-EPI: 39 MLS/MIN/1.73/M2
GLUCOSE SERPL-MCNC: 151 MG/DL (ref 82–115)
POTASSIUM SERPL-SCNC: 5.1 MMOL/L (ref 3.5–5.1)
SODIUM SERPL-SCNC: 143 MMOL/L (ref 136–145)

## 2023-01-18 PROCEDURE — 80048 BASIC METABOLIC PNL TOTAL CA: CPT

## 2023-01-18 PROCEDURE — 36415 COLL VENOUS BLD VENIPUNCTURE: CPT

## 2023-03-06 ENCOUNTER — LAB VISIT (OUTPATIENT)
Dept: LAB | Facility: HOSPITAL | Age: 68
End: 2023-03-06
Attending: INTERNAL MEDICINE
Payer: MEDICARE

## 2023-03-06 DIAGNOSIS — E11.22 TYPE 2 DIABETES MELLITUS WITH END-STAGE RENAL DISEASE: Primary | ICD-10-CM

## 2023-03-06 DIAGNOSIS — R80.9 PROTEINURIA, UNSPECIFIED TYPE: ICD-10-CM

## 2023-03-06 DIAGNOSIS — E55.9 AVITAMINOSIS D: ICD-10-CM

## 2023-03-06 DIAGNOSIS — N18.6 TYPE 2 DIABETES MELLITUS WITH END-STAGE RENAL DISEASE: Primary | ICD-10-CM

## 2023-03-06 DIAGNOSIS — Z79.899 ENCOUNTER FOR LONG-TERM (CURRENT) USE OF OTHER MEDICATIONS: ICD-10-CM

## 2023-03-06 DIAGNOSIS — E21.1 SECONDARY HYPERPARATHYROIDISM, NON-RENAL: ICD-10-CM

## 2023-03-06 DIAGNOSIS — I12.9 PARENCHYMAL RENAL HYPERTENSION: ICD-10-CM

## 2023-03-06 DIAGNOSIS — E83.51 HYPOCALCEMIA: ICD-10-CM

## 2023-03-06 DIAGNOSIS — E87.5 HYPERPOTASSEMIA: ICD-10-CM

## 2023-03-06 DIAGNOSIS — N18.32 CHRONIC KIDNEY DISEASE (CKD) STAGE G3B/A1, MODERATELY DECREASED GLOMERULAR FILTRATION RATE (GFR) BETWEEN 30-44 ML/MIN/1.73 SQUARE METER AND ALBUMINURIA CREATININE RATIO LESS THAN 30 MG/G: ICD-10-CM

## 2023-03-06 LAB
ALBUMIN SERPL-MCNC: 3.5 G/DL (ref 3.4–4.8)
ALBUMIN/GLOB SERPL: 1.1 RATIO (ref 1.1–2)
ALP SERPL-CCNC: 81 UNIT/L (ref 40–150)
ALT SERPL-CCNC: 24 UNIT/L (ref 0–55)
AST SERPL-CCNC: 14 UNIT/L (ref 5–34)
BASOPHILS # BLD AUTO: 0.03 X10(3)/MCL (ref 0–0.2)
BASOPHILS NFR BLD AUTO: 0.4 %
BILIRUBIN DIRECT+TOT PNL SERPL-MCNC: 1 MG/DL
BUN SERPL-MCNC: 27.4 MG/DL (ref 8.4–25.7)
CALCIUM SERPL-MCNC: 9.4 MG/DL (ref 8.8–10)
CHLORIDE SERPL-SCNC: 108 MMOL/L (ref 98–107)
CO2 SERPL-SCNC: 27 MMOL/L (ref 23–31)
CREAT SERPL-MCNC: 1.86 MG/DL (ref 0.73–1.18)
EOSINOPHIL # BLD AUTO: 0.18 X10(3)/MCL (ref 0–0.9)
EOSINOPHIL NFR BLD AUTO: 2.5 %
ERYTHROCYTE [DISTWIDTH] IN BLOOD BY AUTOMATED COUNT: 13.7 % (ref 11.5–17)
GFR SERPLBLD CREATININE-BSD FMLA CKD-EPI: 39 MLS/MIN/1.73/M2
GLOBULIN SER-MCNC: 3.1 GM/DL (ref 2.4–3.5)
GLUCOSE SERPL-MCNC: 184 MG/DL (ref 82–115)
HCT VFR BLD AUTO: 44.6 % (ref 42–52)
HGB BLD-MCNC: 14.4 G/DL (ref 14–18)
IMM GRANULOCYTES # BLD AUTO: 0.01 X10(3)/MCL (ref 0–0.04)
IMM GRANULOCYTES NFR BLD AUTO: 0.1 %
LYMPHOCYTES # BLD AUTO: 1.42 X10(3)/MCL (ref 0.6–4.6)
LYMPHOCYTES NFR BLD AUTO: 19.9 %
MAGNESIUM SERPL-MCNC: 1.9 MG/DL (ref 1.6–2.6)
MCH RBC QN AUTO: 30.7 PG
MCHC RBC AUTO-ENTMCNC: 32.3 G/DL (ref 33–36)
MCV RBC AUTO: 95.1 FL (ref 80–94)
MONOCYTES # BLD AUTO: 0.51 X10(3)/MCL (ref 0.1–1.3)
MONOCYTES NFR BLD AUTO: 7.1 %
NEUTROPHILS # BLD AUTO: 4.99 X10(3)/MCL (ref 2.1–9.2)
NEUTROPHILS NFR BLD AUTO: 70 %
PHOSPHATE SERPL-MCNC: 3.6 MG/DL (ref 2.3–4.7)
PLATELET # BLD AUTO: 158 X10(3)/MCL (ref 130–400)
PMV BLD AUTO: 11.4 FL (ref 7.4–10.4)
POTASSIUM SERPL-SCNC: 4.8 MMOL/L (ref 3.5–5.1)
PROT SERPL-MCNC: 6.6 GM/DL (ref 5.8–7.6)
PTH-INTACT SERPL-MCNC: 151.1 PG/ML (ref 8.7–77)
RBC # BLD AUTO: 4.69 X10(6)/MCL (ref 4.7–6.1)
SODIUM SERPL-SCNC: 144 MMOL/L (ref 136–145)
WBC # SPEC AUTO: 7.1 X10(3)/MCL (ref 4.5–11.5)

## 2023-03-06 PROCEDURE — 83735 ASSAY OF MAGNESIUM: CPT

## 2023-03-06 PROCEDURE — 84100 ASSAY OF PHOSPHORUS: CPT

## 2023-03-06 PROCEDURE — 83970 ASSAY OF PARATHORMONE: CPT

## 2023-03-06 PROCEDURE — 80053 COMPREHEN METABOLIC PANEL: CPT

## 2023-03-06 PROCEDURE — 85025 COMPLETE CBC W/AUTO DIFF WBC: CPT

## 2023-03-06 PROCEDURE — 36415 COLL VENOUS BLD VENIPUNCTURE: CPT

## 2023-07-03 ENCOUNTER — LAB VISIT (OUTPATIENT)
Dept: LAB | Facility: HOSPITAL | Age: 68
End: 2023-07-03
Attending: INTERNAL MEDICINE
Payer: MEDICARE

## 2023-07-03 DIAGNOSIS — E55.9 AVITAMINOSIS D: ICD-10-CM

## 2023-07-03 DIAGNOSIS — E87.5 HYPERPOTASSEMIA: Primary | ICD-10-CM

## 2023-07-03 DIAGNOSIS — N18.32 CHRONIC KIDNEY DISEASE (CKD) STAGE G3B/A1, MODERATELY DECREASED GLOMERULAR FILTRATION RATE (GFR) BETWEEN 30-44 ML/MIN/1.73 SQUARE METER AND ALBUMINURIA CREATININE RATIO LESS THAN 30 MG/G: ICD-10-CM

## 2023-07-03 LAB
ALBUMIN SERPL-MCNC: 3.6 G/DL (ref 3.4–4.8)
ALBUMIN/GLOB SERPL: 1.4 RATIO (ref 1.1–2)
ALP SERPL-CCNC: 72 UNIT/L (ref 40–150)
ALT SERPL-CCNC: 23 UNIT/L (ref 0–55)
AST SERPL-CCNC: 13 UNIT/L (ref 5–34)
BASOPHILS # BLD AUTO: 0.03 X10(3)/MCL
BASOPHILS NFR BLD AUTO: 0.4 %
BILIRUBIN DIRECT+TOT PNL SERPL-MCNC: 0.9 MG/DL
BUN SERPL-MCNC: 29.2 MG/DL (ref 8.4–25.7)
CALCIUM SERPL-MCNC: 8.8 MG/DL (ref 8.8–10)
CHLORIDE SERPL-SCNC: 109 MMOL/L (ref 98–107)
CO2 SERPL-SCNC: 25 MMOL/L (ref 23–31)
CREAT SERPL-MCNC: 2 MG/DL (ref 0.73–1.18)
DEPRECATED CALCIDIOL+CALCIFEROL SERPL-MC: 25.6 NG/ML (ref 30–80)
EOSINOPHIL # BLD AUTO: 0.22 X10(3)/MCL (ref 0–0.9)
EOSINOPHIL NFR BLD AUTO: 2.9 %
ERYTHROCYTE [DISTWIDTH] IN BLOOD BY AUTOMATED COUNT: 13.7 % (ref 11.5–17)
GFR SERPLBLD CREATININE-BSD FMLA CKD-EPI: 36 MLS/MIN/1.73/M2
GLOBULIN SER-MCNC: 2.6 GM/DL (ref 2.4–3.5)
GLUCOSE SERPL-MCNC: 134 MG/DL (ref 82–115)
HCT VFR BLD AUTO: 42.5 % (ref 42–52)
HGB BLD-MCNC: 13.6 G/DL (ref 14–18)
IMM GRANULOCYTES # BLD AUTO: 0.02 X10(3)/MCL (ref 0–0.04)
IMM GRANULOCYTES NFR BLD AUTO: 0.3 %
LYMPHOCYTES # BLD AUTO: 2.35 X10(3)/MCL (ref 0.6–4.6)
LYMPHOCYTES NFR BLD AUTO: 30.7 %
MAGNESIUM SERPL-MCNC: 1.9 MG/DL (ref 1.6–2.6)
MCH RBC QN AUTO: 31.1 PG (ref 27–31)
MCHC RBC AUTO-ENTMCNC: 32 G/DL (ref 33–36)
MCV RBC AUTO: 97.3 FL (ref 80–94)
MONOCYTES # BLD AUTO: 0.71 X10(3)/MCL (ref 0.1–1.3)
MONOCYTES NFR BLD AUTO: 9.3 %
NEUTROPHILS # BLD AUTO: 4.32 X10(3)/MCL (ref 2.1–9.2)
NEUTROPHILS NFR BLD AUTO: 56.4 %
PHOSPHATE SERPL-MCNC: 3.1 MG/DL (ref 2.3–4.7)
PLATELET # BLD AUTO: 153 X10(3)/MCL (ref 130–400)
PMV BLD AUTO: 11 FL (ref 7.4–10.4)
POTASSIUM SERPL-SCNC: 4.7 MMOL/L (ref 3.5–5.1)
PROT SERPL-MCNC: 6.2 GM/DL (ref 5.8–7.6)
PTH-INTACT SERPL-MCNC: 242.3 PG/ML (ref 8.7–77)
RBC # BLD AUTO: 4.37 X10(6)/MCL (ref 4.7–6.1)
SODIUM SERPL-SCNC: 141 MMOL/L (ref 136–145)
WBC # SPEC AUTO: 7.65 X10(3)/MCL (ref 4.5–11.5)

## 2023-07-03 PROCEDURE — 85025 COMPLETE CBC W/AUTO DIFF WBC: CPT

## 2023-07-03 PROCEDURE — 83970 ASSAY OF PARATHORMONE: CPT

## 2023-07-03 PROCEDURE — 83735 ASSAY OF MAGNESIUM: CPT

## 2023-07-03 PROCEDURE — 84100 ASSAY OF PHOSPHORUS: CPT

## 2023-07-03 PROCEDURE — 80053 COMPREHEN METABOLIC PANEL: CPT

## 2023-07-03 PROCEDURE — 82306 VITAMIN D 25 HYDROXY: CPT

## 2023-07-03 PROCEDURE — 36415 COLL VENOUS BLD VENIPUNCTURE: CPT
